# Patient Record
Sex: MALE | Race: WHITE | NOT HISPANIC OR LATINO | Employment: OTHER | URBAN - METROPOLITAN AREA
[De-identification: names, ages, dates, MRNs, and addresses within clinical notes are randomized per-mention and may not be internally consistent; named-entity substitution may affect disease eponyms.]

---

## 2019-06-07 ENCOUNTER — HOSPITAL ENCOUNTER (OUTPATIENT)
Facility: HOSPITAL | Age: 64
Setting detail: OBSERVATION
LOS: 1 days | Discharge: HOME | End: 2019-06-08
Attending: INTERNAL MEDICINE | Admitting: INTERNAL MEDICINE

## 2019-06-07 ENCOUNTER — APPOINTMENT (OUTPATIENT)
Dept: GENERAL RADIOLOGY | Facility: HOSPITAL | Age: 64
End: 2019-06-07

## 2019-06-07 ENCOUNTER — APPOINTMENT (OUTPATIENT)
Dept: MRI IMAGING | Facility: HOSPITAL | Age: 64
End: 2019-06-07

## 2019-06-07 DIAGNOSIS — Z74.09 IMPAIRED FUNCTIONAL MOBILITY, BALANCE, GAIT, AND ENDURANCE: ICD-10-CM

## 2019-06-07 DIAGNOSIS — Z78.9 IMPAIRED MOBILITY AND ADLS: Primary | ICD-10-CM

## 2019-06-07 DIAGNOSIS — Z74.09 IMPAIRED MOBILITY AND ADLS: Primary | ICD-10-CM

## 2019-06-07 PROBLEM — I63.9 CVA (CEREBRAL VASCULAR ACCIDENT) (HCC): Status: ACTIVE | Noted: 2019-06-07

## 2019-06-07 PROBLEM — G45.9 TRANSIENT ISCHEMIC ATTACK (TIA): Status: ACTIVE | Noted: 2019-06-07

## 2019-06-07 PROBLEM — G45.9 TIA (TRANSIENT ISCHEMIC ATTACK): Status: ACTIVE | Noted: 2019-06-07

## 2019-06-07 PROBLEM — I10 ESSENTIAL HYPERTENSION: Status: ACTIVE | Noted: 2019-06-07

## 2019-06-07 PROBLEM — N28.9 RENAL INSUFFICIENCY: Status: ACTIVE | Noted: 2019-06-07

## 2019-06-07 LAB — GLUCOSE BLDC GLUCOMTR-MCNC: 94 MG/DL (ref 70–130)

## 2019-06-07 PROCEDURE — G0378 HOSPITAL OBSERVATION PER HR: HCPCS

## 2019-06-07 PROCEDURE — 72141 MRI NECK SPINE W/O DYE: CPT

## 2019-06-07 PROCEDURE — 70200 X-RAY EXAM OF EYE SOCKETS: CPT

## 2019-06-07 PROCEDURE — 99220 PR INITIAL OBSERVATION CARE/DAY 70 MINUTES: CPT | Performed by: HOSPITALIST

## 2019-06-07 PROCEDURE — 70548 MR ANGIOGRAPHY NECK W/DYE: CPT

## 2019-06-07 PROCEDURE — A9577 INJ MULTIHANCE: HCPCS | Performed by: HOSPITALIST

## 2019-06-07 PROCEDURE — 70551 MRI BRAIN STEM W/O DYE: CPT

## 2019-06-07 PROCEDURE — 99205 OFFICE O/P NEW HI 60 MIN: CPT | Performed by: PSYCHIATRY & NEUROLOGY

## 2019-06-07 PROCEDURE — 82962 GLUCOSE BLOOD TEST: CPT

## 2019-06-07 PROCEDURE — 70544 MR ANGIOGRAPHY HEAD W/O DYE: CPT

## 2019-06-07 PROCEDURE — 0 GADOBENATE DIMEGLUMINE 529 MG/ML SOLUTION: Performed by: HOSPITALIST

## 2019-06-07 RX ORDER — SODIUM CHLORIDE 0.9 % (FLUSH) 0.9 %
3 SYRINGE (ML) INJECTION EVERY 12 HOURS SCHEDULED
Status: DISCONTINUED | OUTPATIENT
Start: 2019-06-07 | End: 2019-06-08 | Stop reason: HOSPADM

## 2019-06-07 RX ORDER — ASPIRIN 300 MG/1
300 SUPPOSITORY RECTAL DAILY
Status: DISCONTINUED | OUTPATIENT
Start: 2019-06-08 | End: 2019-06-08 | Stop reason: HOSPADM

## 2019-06-07 RX ORDER — SUCRALFATE 1 G/1
1 TABLET ORAL
Status: DISCONTINUED | OUTPATIENT
Start: 2019-06-08 | End: 2019-06-08 | Stop reason: HOSPADM

## 2019-06-07 RX ORDER — SODIUM CHLORIDE 0.9 % (FLUSH) 0.9 %
3-10 SYRINGE (ML) INJECTION AS NEEDED
Status: DISCONTINUED | OUTPATIENT
Start: 2019-06-07 | End: 2019-06-08 | Stop reason: HOSPADM

## 2019-06-07 RX ORDER — LANSOPRAZOLE 30 MG/1
30 CAPSULE, DELAYED RELEASE ORAL DAILY
COMMUNITY

## 2019-06-07 RX ORDER — ATORVASTATIN CALCIUM 40 MG/1
80 TABLET, FILM COATED ORAL NIGHTLY
Status: DISCONTINUED | OUTPATIENT
Start: 2019-06-08 | End: 2019-06-08 | Stop reason: HOSPADM

## 2019-06-07 RX ORDER — SODIUM CHLORIDE 9 MG/ML
50 INJECTION, SOLUTION INTRAVENOUS CONTINUOUS
Status: DISCONTINUED | OUTPATIENT
Start: 2019-06-08 | End: 2019-06-08

## 2019-06-07 RX ORDER — SUCRALFATE 1 G/1
1 TABLET ORAL 3 TIMES DAILY
Status: ON HOLD | COMMUNITY
End: 2020-02-19

## 2019-06-07 RX ORDER — SPIRONOLACTONE 50 MG/1
50 TABLET, FILM COATED ORAL 4 TIMES DAILY
COMMUNITY

## 2019-06-07 RX ORDER — AMILORIDE HYDROCHLORIDE 5 MG/1
5 TABLET ORAL NIGHTLY
COMMUNITY

## 2019-06-07 RX ORDER — LORAZEPAM 1 MG/1
1 TABLET ORAL 3 TIMES DAILY PRN
COMMUNITY

## 2019-06-07 RX ORDER — LORAZEPAM 1 MG/1
1 TABLET ORAL 3 TIMES DAILY PRN
Status: DISCONTINUED | OUTPATIENT
Start: 2019-06-07 | End: 2019-06-08 | Stop reason: HOSPADM

## 2019-06-07 RX ORDER — ASPIRIN 81 MG/1
81 TABLET, CHEWABLE ORAL DAILY
Status: DISCONTINUED | OUTPATIENT
Start: 2019-06-08 | End: 2019-06-08 | Stop reason: HOSPADM

## 2019-06-07 RX ORDER — POTASSIUM CHLORIDE 750 MG/1
50 TABLET, FILM COATED, EXTENDED RELEASE ORAL 4 TIMES DAILY
COMMUNITY

## 2019-06-07 RX ORDER — ATORVASTATIN CALCIUM 40 MG/1
80 TABLET, FILM COATED ORAL NIGHTLY
Status: DISCONTINUED | OUTPATIENT
Start: 2019-06-07 | End: 2019-06-07

## 2019-06-07 RX ORDER — PANTOPRAZOLE SODIUM 40 MG/1
40 TABLET, DELAYED RELEASE ORAL
Status: DISCONTINUED | OUTPATIENT
Start: 2019-06-08 | End: 2019-06-08 | Stop reason: HOSPADM

## 2019-06-07 RX ADMIN — SODIUM CHLORIDE, PRESERVATIVE FREE 3 ML: 5 INJECTION INTRAVENOUS at 23:21

## 2019-06-07 RX ADMIN — LORAZEPAM 1 MG: 1 TABLET ORAL at 23:21

## 2019-06-07 RX ADMIN — GADOBENATE DIMEGLUMINE 10 ML: 529 INJECTION, SOLUTION INTRAVENOUS at 22:45

## 2019-06-07 RX ADMIN — SODIUM CHLORIDE 50 ML/HR: 9 INJECTION, SOLUTION INTRAVENOUS at 23:21

## 2019-06-07 NOTE — NURSING NOTE
ACC REVIEW REPORT: [facility]          PATIENT NAME: Jaswant Gonzales    PATIENT ID: [id number]    BED: S333    BED TYPE: TELE    BED GIVEN TO: ADRI ZUÑIGA RN    TIME BED GIVEN: 1236    YOB: 1955    AGE: 64    GENDER: MALE    PREVIOUS ADMIT TO [facility]: NO    PREVIOUS ADMISSION DATE:     PATIENT CLASS: OUTPATIENT    TODAY'S DATE: 6/7/2019    TRANSFER DATE: 6/7/19    ETA: 1544    TRANSFERRING FACILITY: [facility]    TRANSFERRING FACILITY PHONE # : [phone]    TRANSFERRING MD: MELISSA    DATE/TIME REQUEST RECEIVED: 6-7-19 @ [address] [place] [address] RN: PAULO HOLLOWAY RN    REPORT FROM: ADRI ZUÑIGA RN    TIME REPORT TAKEN: 1236    DIAGNOSIS: CVA    REASON FOR TRANSFER TO [facility]: HIGHER LEVEL OF CARE    TRANSPORTATION: AMBULANCE    CLINICAL REASON FOR TRANSFER TO [facility]: HIGHER LEVEL OF CARE      CLINICAL INFORMATION    HEIGHT: 66 INCHES    WEIGHT: 145 LBS    ALLERGIES: KNDA    VEGA: NO    INFECTIOUS DISEASE: NONE    ISOLATION: NONE        LAST VITAL SIGNS:  TIME:   TEMP: 98.0  PULSE: 75  B/P: 167/76  RESP: 18    LAB INFORMATION:   CREATINE 1.80  SODIUM 133    CULTURE INFORMATION: NONE    MEDS/IV FLUIDS:   # 20 RIGHT AC  NS 1000 ML   MG  LIPITOR 80 MG        CARDIAC SYSTEM:    CHEST PAIN: NONE    RATE:     SCALE:     RHYTHM: NSR    Is patient taking or has patient been given any drugs that could increase bleeding? NONE  (Plavix, Brilinta, Effient, Eliquis, Xarelto, Warfarin, Integrilin, Angiomax)    DRUG:      DOSE/FREQUENCY:       RESPIRATORY SYSTEM:    LUNG SOUNDS:    CLEAR: YES  CRACKLES:   WHEEZES:   RHONCHI:   DIMINISHED:     OXYGEN: ROOM AIR    O2 SAT: 97 %    RESPIRATORY STATUS: STABLE      CNS/MUSCULOSKELETAL    ALERT AND ORIENTED:    PERSON: YES  PLACE: YES  TIME: YES    INJURY:  WHERE: NONE    SONIA COMA SCALE:    E: 4  M: 6  V: 5    STROKE SCALE: 0      CAT SCAN RESULTS:   NEGATIVE     MRI RESULTS:     CNS/MUSCULOSKELETAL NOTES: PATIENT HAS HAD 2 SPELLS CAUSE SOME SORT OF  PARESIS OR NUMBNESS BOTH LASTING LESS THAN 10 MINUTES      GI//GY      ABDOMINAL PAIN: NO    VOMITING: NO    DIARRHEA: NO    NAUSEA: NO    BOWEL SOUNDS: ACTIVE    OCCULT STOOL: NONE    VAGINAL BLEEDING: N/A    TESTICULAR PAIN: NO    HEMATURIA: NO    PAST MEDICAL HISTORY:   BARTERS SYNDROME    OTHER SYMPTOM NOTES:   RIGHT SIDED NUMBNESS  UNABLE TO WALK  LASTED ABOUT 10 TO  15 MINUTES     SECOND EPISODE   RIGHT FACIAL DROOP  UNABLE TO TALK  LASTED ABOUT 10 MINUTES    ADDITIONAL NOTES:           Gill Maldonado RN  6/7/2019  12:42 PM

## 2019-06-07 NOTE — PLAN OF CARE
Problem: Patient Care Overview  Goal: Plan of Care Review  Outcome: Ongoing (interventions implemented as appropriate)   06/07/19 1847   Coping/Psychosocial   Plan of Care Reviewed With patient;spouse   Plan of Care Review   Progress no change   OTHER   Outcome Summary Pt is a direct admission from [place] with two episodes of stroke-like numbness of right side and face; symptoms caused pt to fall at home. Currently no such symptoms, NIH 0 on admission. Pt up with one assist for fall prevention. MRI still to be completed. VSS.        Problem: Fall Risk (Adult)  Goal: Identify Related Risk Factors and Signs and Symptoms  Outcome: Outcome(s) achieved Date Met: 06/07/19 06/07/19 1847   Fall Risk (Adult)   Related Risk Factors (Fall Risk) age-related changes;history of falls;impaired vision;sleep pattern alteration;slippery/uneven surfaces;environment unfamiliar   Signs and Symptoms (Fall Risk) presence of risk factors     Goal: Absence of Fall  Outcome: Outcome(s) achieved Date Met: 06/07/19 06/07/19 1847   Fall Risk (Adult)   Absence of Fall achieves outcome  (on this shift)       Problem: Stroke (Ischemic) (Adult)  Goal: Signs and Symptoms of Listed Potential Problems Will be Absent, Minimized or Managed (Stroke)  Outcome: Ongoing (interventions implemented as appropriate)   06/07/19 1847   Goal/Outcome Evaluation   Problems Assessed (Stroke (Ischemic)) all   Problems Assessed (Stroke (Ischemic)) situational response  (NIH 0; no deficits noted at this time)

## 2019-06-07 NOTE — CONSULTS
"Subjective     CC: TIA    History of Present Illness   Jaswant Gonzales is a 64 y.o. male is seen today in consultation for suspected TIA. He has had 3 episodes today lasting about 3-5 minutes each of right hemiparesis/hemianesthesia, associated on at least one occasion with right facial paresis and aphasia. There are no triggering or modifying factors.    A head CT was normal in [place]. He was given  mg and Lipitor 80 mg in [place] prior to transfer to [facility].    I have reviewed and confirmed the past family, social and medical history as accurate on 6/7/19.    FH: reviewed and non-contributory  PMH: HTN, kidney disease     Review of Systems   Constitutional: Negative.    Respiratory: Negative.    Cardiovascular: Negative.    Gastrointestinal: Negative.    Genitourinary: Negative.    Musculoskeletal: Negative.    All other systems reviewed and are negative.      Objective   General appearance today is normal.   Peripheral pulses were present and symmetric.   The ophthalmoscopic exam today is unremarkable. This discs and posterior elements are unremarkable.    /95 (BP Location: Right arm, Patient Position: Sitting)   Pulse 64   Temp 98 °F (36.7 °C) (Oral)   Resp 14   Ht 167.6 cm (66\")   Wt 65 kg (143 lb 6.4 oz)   SpO2 97%   BMI 23.15 kg/m²     Physical Exam   Constitutional: He is oriented to person, place, and time.   Neurological: He is oriented to person, place, and time. He has normal strength. He has a normal Finger-Nose-Finger Test. Gait normal.   Reflex Scores:       Tricep reflexes are 2+ on the right side and 2+ on the left side.       Bicep reflexes are 2+ on the right side and 2+ on the left side.       Brachioradialis reflexes are 2+ on the right side and 2+ on the left side.       Patellar reflexes are 2+ on the right side and 2+ on the left side.       Achilles reflexes are 2+ on the right side and 2+ on the left side.  Psychiatric: His speech is normal.        Neurologic Exam "     Mental Status   Oriented to person, place, and time.   Registration: recalls 3 of 3 objects. Recall at 5 minutes: recalls 2 of 3 objects. Follows 3 step commands.   Attention: normal. Concentration: normal.   Speech: speech is normal   Level of consciousness: alert  Knowledge: good.   Able to name object. Able to repeat. Normal comprehension.     Cranial Nerves   Cranial nerves II through XII intact.     Motor Exam   Muscle bulk: normal  Overall muscle tone: normal    Strength   Strength 5/5 throughout.     Sensory Exam   Light touch normal.     Gait, Coordination, and Reflexes     Gait  Gait: normal    Coordination   Finger to nose coordination: normal    Reflexes   Right brachioradialis: 2+  Left brachioradialis: 2+  Right biceps: 2+  Left biceps: 2+  Right triceps: 2+  Left triceps: 2+  Right patellar: 2+  Left patellar: 2+  Right achilles: 2+  Left achilles: 2+      Laboratory and radiological testing: glucose=94; orbit x-ray-no metal      Assessment/Plan     Jaswant Gonzales is admitted with suspected recurrent TIA's. His CTP/CTA were cancelled initially due to concern about renal insufficiency. I feel that imaging of his arteries is most critical, and MRI/MRA's have been ordered stat. If these fail to show evidence of critical stenosis, then I will favor continued ASA/statin therapy, and will await his ECHO. This was discussed with Mr. Gonzales and his wife.    As part of this visit I reviewed prior lab results, reviewed radiology results and obtained additional history from the family which is incorporated in the HPI. Please see above for details.

## 2019-06-08 ENCOUNTER — APPOINTMENT (OUTPATIENT)
Dept: CARDIOLOGY | Facility: HOSPITAL | Age: 64
End: 2019-06-08

## 2019-06-08 VITALS
BODY MASS INDEX: 22.98 KG/M2 | HEIGHT: 66 IN | TEMPERATURE: 97.8 F | OXYGEN SATURATION: 97 % | WEIGHT: 143 LBS | RESPIRATION RATE: 16 BRPM | SYSTOLIC BLOOD PRESSURE: 131 MMHG | DIASTOLIC BLOOD PRESSURE: 76 MMHG | HEART RATE: 61 BPM

## 2019-06-08 PROBLEM — M48.02 STENOSIS OF CERVICAL SPINE: Status: ACTIVE | Noted: 2019-06-08

## 2019-06-08 PROBLEM — N18.30 CKD (CHRONIC KIDNEY DISEASE) STAGE 3, GFR 30-59 ML/MIN (HCC): Status: ACTIVE | Noted: 2019-06-08

## 2019-06-08 LAB
ANION GAP SERPL CALCULATED.3IONS-SCNC: 16 MMOL/L
BH CV ECHO MEAS - AO MAX PG (FULL): 1.3 MMHG
BH CV ECHO MEAS - AO MAX PG: 3 MMHG
BH CV ECHO MEAS - AO MEAN PG (FULL): 0.77 MMHG
BH CV ECHO MEAS - AO MEAN PG: 1.6 MMHG
BH CV ECHO MEAS - AO V2 MAX: 86.3 CM/SEC
BH CV ECHO MEAS - AO V2 MEAN: 58.1 CM/SEC
BH CV ECHO MEAS - AO V2 VTI: 18 CM
BH CV ECHO MEAS - AVA(I,A): 2.4 CM^2
BH CV ECHO MEAS - AVA(I,D): 2.4 CM^2
BH CV ECHO MEAS - AVA(V,A): 2.7 CM^2
BH CV ECHO MEAS - AVA(V,D): 2.7 CM^2
BH CV ECHO MEAS - BSA(HAYCOCK): 1.7 M^2
BH CV ECHO MEAS - BSA: 1.7 M^2
BH CV ECHO MEAS - BZI_BMI: 23.1 KILOGRAMS/M^2
BH CV ECHO MEAS - BZI_METRIC_HEIGHT: 167.6 CM
BH CV ECHO MEAS - BZI_METRIC_WEIGHT: 64.9 KG
BH CV ECHO MEAS - EDV(CUBED): 119 ML
BH CV ECHO MEAS - EDV(MOD-SP2): 78 ML
BH CV ECHO MEAS - EDV(MOD-SP4): 81 ML
BH CV ECHO MEAS - EDV(TEICH): 113.8 ML
BH CV ECHO MEAS - EF(CUBED): 33.6 %
BH CV ECHO MEAS - EF(MOD-BP): 47 %
BH CV ECHO MEAS - EF(MOD-SP2): 51.3 %
BH CV ECHO MEAS - EF(MOD-SP4): 46.9 %
BH CV ECHO MEAS - EF(TEICH): 27.4 %
BH CV ECHO MEAS - ESV(CUBED): 79 ML
BH CV ECHO MEAS - ESV(MOD-SP2): 38 ML
BH CV ECHO MEAS - ESV(MOD-SP4): 43 ML
BH CV ECHO MEAS - ESV(TEICH): 82.6 ML
BH CV ECHO MEAS - FS: 12.8 %
BH CV ECHO MEAS - IVS/LVPW: 1
BH CV ECHO MEAS - IVSD: 1.1 CM
BH CV ECHO MEAS - LA DIMENSION: 2.8 CM
BH CV ECHO MEAS - LAD MAJOR: 3.4 CM
BH CV ECHO MEAS - LAT PEAK E' VEL: 4.6 CM/SEC
BH CV ECHO MEAS - LATERAL E/E' RATIO: 9.1
BH CV ECHO MEAS - LV DIASTOLIC VOL/BSA (35-75): 46.7 ML/M^2
BH CV ECHO MEAS - LV MASS(C)D: 185.4 GRAMS
BH CV ECHO MEAS - LV MASS(C)DI: 106.9 GRAMS/M^2
BH CV ECHO MEAS - LV MAX PG: 1.7 MMHG
BH CV ECHO MEAS - LV MEAN PG: 0.81 MMHG
BH CV ECHO MEAS - LV SYSTOLIC VOL/BSA (12-30): 24.8 ML/M^2
BH CV ECHO MEAS - LV V1 MAX: 65.2 CM/SEC
BH CV ECHO MEAS - LV V1 MEAN: 42.2 CM/SEC
BH CV ECHO MEAS - LV V1 VTI: 12.3 CM
BH CV ECHO MEAS - LVIDD: 4.9 CM
BH CV ECHO MEAS - LVIDS: 4.3 CM
BH CV ECHO MEAS - LVLD AP2: 7.1 CM
BH CV ECHO MEAS - LVLD AP4: 7 CM
BH CV ECHO MEAS - LVLS AP2: 6.2 CM
BH CV ECHO MEAS - LVLS AP4: 6.2 CM
BH CV ECHO MEAS - LVOT AREA (M): 3.5 CM^2
BH CV ECHO MEAS - LVOT AREA: 3.6 CM^2
BH CV ECHO MEAS - LVOT DIAM: 2.1 CM
BH CV ECHO MEAS - LVPWD: 1.1 CM
BH CV ECHO MEAS - MED PEAK E' VEL: 4.7 CM/SEC
BH CV ECHO MEAS - MEDIAL E/E' RATIO: 8.8
BH CV ECHO MEAS - MV A MAX VEL: 63.7 CM/SEC
BH CV ECHO MEAS - MV DEC TIME: 0.38 SEC
BH CV ECHO MEAS - MV E MAX VEL: 42.9 CM/SEC
BH CV ECHO MEAS - MV E/A: 0.67
BH CV ECHO MEAS - PA ACC SLOPE: 730.9 CM/SEC^2
BH CV ECHO MEAS - PA ACC TIME: 0.1 SEC
BH CV ECHO MEAS - PA MAX PG: 2.3 MMHG
BH CV ECHO MEAS - PA PR(ACCEL): 34.6 MMHG
BH CV ECHO MEAS - PA V2 MAX: 76 CM/SEC
BH CV ECHO MEAS - PI END-D VEL: 112.7 CM/SEC
BH CV ECHO MEAS - SI(CUBED): 23.1 ML/M^2
BH CV ECHO MEAS - SI(LVOT): 25.3 ML/M^2
BH CV ECHO MEAS - SI(MOD-SP2): 23.1 ML/M^2
BH CV ECHO MEAS - SI(MOD-SP4): 21.9 ML/M^2
BH CV ECHO MEAS - SI(TEICH): 18 ML/M^2
BH CV ECHO MEAS - SV(CUBED): 40 ML
BH CV ECHO MEAS - SV(LVOT): 44 ML
BH CV ECHO MEAS - SV(MOD-SP2): 40 ML
BH CV ECHO MEAS - SV(MOD-SP4): 38 ML
BH CV ECHO MEAS - SV(TEICH): 31.2 ML
BH CV ECHO MEAS - TAPSE (>1.6): 1.8 CM2
BH CV ECHO MEASUREMENTS AVERAGE E/E' RATIO: 9.23
BH CV VAS BP RIGHT ARM: NORMAL MMHG
BH CV XLRA - RV BASE: 2.8 CM
BH CV XLRA - RV LENGTH: 5.8 CM
BH CV XLRA - RV MID: 2.3 CM
BH CV XLRA - TDI S': 9.91 CM/SEC
BUN BLD-MCNC: 19 MG/DL (ref 8–23)
BUN/CREAT SERPL: 14.1 (ref 7–25)
CALCIUM SPEC-SCNC: 9.2 MG/DL (ref 8.6–10.5)
CHLORIDE SERPL-SCNC: 95 MMOL/L (ref 98–107)
CHOLEST SERPL-MCNC: 203 MG/DL (ref 0–200)
CO2 SERPL-SCNC: 25 MMOL/L (ref 22–29)
CREAT BLD-MCNC: 1.35 MG/DL (ref 0.76–1.27)
DEPRECATED RDW RBC AUTO: 39.4 FL (ref 37–54)
ERYTHROCYTE [DISTWIDTH] IN BLOOD BY AUTOMATED COUNT: 11.9 % (ref 12.3–15.4)
GFR SERPL CREATININE-BSD FRML MDRD: 53 ML/MIN/1.73
GLUCOSE BLD-MCNC: 92 MG/DL (ref 65–99)
GLUCOSE BLDC GLUCOMTR-MCNC: 91 MG/DL (ref 70–130)
HBA1C MFR BLD: 5.3 % (ref 4.8–5.6)
HCT VFR BLD AUTO: 47.3 % (ref 37.5–51)
HDLC SERPL-MCNC: 37 MG/DL (ref 40–60)
HGB BLD-MCNC: 15.6 G/DL (ref 13–17.7)
LDLC SERPL CALC-MCNC: 134 MG/DL (ref 0–100)
LDLC/HDLC SERPL: 3.63 {RATIO}
LEFT ATRIUM VOLUME INDEX: 13.8 ML/M^2
LEFT ATRIUM VOLUME: 24 ML
LV EF 2D ECHO EST: 50 %
MCH RBC QN AUTO: 29.9 PG (ref 26.6–33)
MCHC RBC AUTO-ENTMCNC: 33 G/DL (ref 31.5–35.7)
MCV RBC AUTO: 90.8 FL (ref 79–97)
PLATELET # BLD AUTO: 234 10*3/MM3 (ref 140–450)
PMV BLD AUTO: 9.4 FL (ref 6–12)
POTASSIUM BLD-SCNC: 4 MMOL/L (ref 3.5–5.2)
RBC # BLD AUTO: 5.21 10*6/MM3 (ref 4.14–5.8)
SODIUM BLD-SCNC: 136 MMOL/L (ref 136–145)
TRIGL SERPL-MCNC: 159 MG/DL (ref 0–150)
VLDLC SERPL-MCNC: 31.8 MG/DL
WBC NRBC COR # BLD: 7.32 10*3/MM3 (ref 3.4–10.8)

## 2019-06-08 PROCEDURE — G0378 HOSPITAL OBSERVATION PER HR: HCPCS

## 2019-06-08 PROCEDURE — 99213 OFFICE O/P EST LOW 20 MIN: CPT | Performed by: PSYCHIATRY & NEUROLOGY

## 2019-06-08 PROCEDURE — 93306 TTE W/DOPPLER COMPLETE: CPT

## 2019-06-08 PROCEDURE — 97161 PT EVAL LOW COMPLEX 20 MIN: CPT

## 2019-06-08 PROCEDURE — 80048 BASIC METABOLIC PNL TOTAL CA: CPT | Performed by: HOSPITALIST

## 2019-06-08 PROCEDURE — 92523 SPEECH SOUND LANG COMPREHEN: CPT

## 2019-06-08 PROCEDURE — 99217 PR OBSERVATION CARE DISCHARGE MANAGEMENT: CPT | Performed by: INTERNAL MEDICINE

## 2019-06-08 PROCEDURE — 83036 HEMOGLOBIN GLYCOSYLATED A1C: CPT | Performed by: HOSPITALIST

## 2019-06-08 PROCEDURE — 82962 GLUCOSE BLOOD TEST: CPT

## 2019-06-08 PROCEDURE — 93306 TTE W/DOPPLER COMPLETE: CPT | Performed by: INTERNAL MEDICINE

## 2019-06-08 PROCEDURE — 97165 OT EVAL LOW COMPLEX 30 MIN: CPT

## 2019-06-08 PROCEDURE — 80061 LIPID PANEL: CPT | Performed by: HOSPITALIST

## 2019-06-08 PROCEDURE — 85027 COMPLETE CBC AUTOMATED: CPT | Performed by: HOSPITALIST

## 2019-06-08 RX ORDER — ACETAMINOPHEN 325 MG/1
650 TABLET ORAL EVERY 6 HOURS PRN
Status: DISCONTINUED | OUTPATIENT
Start: 2019-06-08 | End: 2019-06-08 | Stop reason: HOSPADM

## 2019-06-08 RX ORDER — AMLODIPINE BESYLATE 2.5 MG/1
2.5 TABLET ORAL
Qty: 30 TABLET | Refills: 0 | Status: ON HOLD | OUTPATIENT
Start: 2019-06-08 | End: 2020-02-19

## 2019-06-08 RX ORDER — ASPIRIN 81 MG/1
81 TABLET, CHEWABLE ORAL DAILY
Qty: 90 TABLET | Refills: 0 | Status: ON HOLD | OUTPATIENT
Start: 2019-06-08 | End: 2020-02-19

## 2019-06-08 RX ORDER — AMLODIPINE BESYLATE 2.5 MG/1
2.5 TABLET ORAL
Status: DISCONTINUED | OUTPATIENT
Start: 2019-06-08 | End: 2019-06-08 | Stop reason: HOSPADM

## 2019-06-08 RX ORDER — ATORVASTATIN CALCIUM 80 MG/1
40 TABLET, FILM COATED ORAL NIGHTLY
Qty: 30 TABLET | Refills: 0 | Status: ON HOLD | OUTPATIENT
Start: 2019-06-08 | End: 2020-02-19

## 2019-06-08 RX ADMIN — AMLODIPINE BESYLATE 2.5 MG: 2.5 TABLET ORAL at 09:56

## 2019-06-08 RX ADMIN — SUCRALFATE 1 G: 1 TABLET ORAL at 06:53

## 2019-06-08 RX ADMIN — ASPIRIN 81 MG 81 MG: 81 TABLET ORAL at 09:56

## 2019-06-08 RX ADMIN — MAGNESIUM OXIDE TAB 400 MG (241.3 MG ELEMENTAL MG) 400 MG: 400 (241.3 MG) TAB at 09:56

## 2019-06-08 RX ADMIN — PANTOPRAZOLE SODIUM 40 MG: 40 TABLET, DELAYED RELEASE ORAL at 06:53

## 2019-06-08 NOTE — PLAN OF CARE
Problem: Patient Care Overview  Goal: Plan of Care Review  Outcome: Ongoing (interventions implemented as appropriate)   06/08/19 3367   Coping/Psychosocial   Plan of Care Reviewed With patient   Plan of Care Review   Progress no change   OTHER   Outcome Summary Pt is a/o x 4. VSS. Pt ambulates well on his own. Due to recent fall he is a falls risk, but refuses the bed alarm. Wife has agreed to standby while the pt ambulates. Pt currently has NIH of 0 and all symptoms he was previously having are resolved. No additional needs or concerns at this time. Will continue to monitor.

## 2019-06-08 NOTE — DISCHARGE INSTR - ACTIVITY
Do not Drive and Do Not Return to Work until you have seen your Primary Care Provider and he/she approves driving and returning to work.

## 2019-06-08 NOTE — PLAN OF CARE
Problem: Patient Care Overview  Goal: Plan of Care Review  Outcome: Ongoing (interventions implemented as appropriate)   06/08/19 7885   Coping/Psychosocial   Plan of Care Reviewed With patient;spouse   SLP evaluation completed. Will sign-off for communication/cognition at this time. Please see note for further details and recommendations.

## 2019-06-08 NOTE — PROGRESS NOTES
"Jaswant Gonzales    Subjective     CC: TIA    History of Present Illness     Jaswant Gonzales is a 64 y.o. male who presented following three episodes yesterday with right sided weakness with aphasia and some facial paresis. Patient reports episodes lasted about 3-5 minutes before resolving completely. Patient reports that he did fall with one episode and called family at that time. He denies any repeat of his symptoms since yesterday and that he is feeling back to his baseline.       There have been no other changes in the patient's interval history since my consult note of 6/7/2019    I have reviewed and confirmed the past family, social and medical history as accurate on 6/8/2019.     Review of Systems   Constitutional: Negative for activity change, appetite change, fatigue and fever.   HENT: Negative for ear pain and sore throat.    Eyes: Negative for pain and visual disturbance.   Respiratory: Negative for cough and shortness of breath.    Cardiovascular: Negative for chest pain and palpitations.   Gastrointestinal: Negative for abdominal pain and nausea.   Endocrine: Negative for cold intolerance and heat intolerance.   Genitourinary: Negative for difficulty urinating and dysuria.   Musculoskeletal: Negative for arthralgias and gait problem.   Skin: Negative for color change and rash.   Neurological: Negative for dizziness, weakness and headaches.   Hematological: Negative for adenopathy. Does not bruise/bleed easily.   Psychiatric/Behavioral: Negative for agitation, confusion and sleep disturbance.       Objective     /81 (BP Location: Right arm, Patient Position: Lying)   Pulse 58   Temp 97.6 °F (36.4 °C) (Oral)   Resp 14   Ht 167.6 cm (66\")   Wt 64.9 kg (143 lb)   SpO2 97%   BMI 23.08 kg/m²     Physical Exam   Constitutional: He is oriented to person, place, and time. He appears well-developed and well-nourished. No distress.   HENT:   Head: Normocephalic.   Right Ear: External ear normal.   Left Ear: " External ear normal.   Eyes: EOM are normal. Pupils are equal, round, and reactive to light.   Neck: Normal range of motion. Neck supple.   Pulmonary/Chest: Effort normal.   Musculoskeletal: Normal range of motion.   Neurological: He is alert and oriented to person, place, and time. No cranial nerve deficit.   Skin: Skin is warm and dry. Capillary refill takes less than 2 seconds. He is not diaphoretic.   Psychiatric: He has a normal mood and affect. His speech is normal and behavior is normal. Judgment and thought content normal.        Neurologic Exam     Mental Status   Oriented to person, place, and time.   Oriented to person.   Oriented to place. Oriented to country, city and area.   Oriented to time. Oriented to year, month, date, day and season.   Attention: normal. Concentration: normal.   Speech: speech is normal   Normal comprehension.     Cranial Nerves     CN III, IV, VI   Pupils are equal, round, and reactive to light.  Extraocular motions are normal.     CN V   Facial sensation intact.     CN VII   Facial expression full, symmetric.     CN XI   Right sternocleidomastoid strength: normal  Left sternocleidomastoid strength: normal  Right trapezius strength: normal  Left trapezius strength: normal    CN XII   Tongue: not atrophic  Tongue deviation: none    Motor Exam   Muscle bulk: normal  Overall muscle tone: normal  Right arm tone: normal  Left arm tone: normal  Right leg tone: normal  Left leg tone: normal      Laboratory and radiological testing is notable for hyperlipidemia, decreased renal function. Preliminary read of ECHO, awaiting final read, demonstrates EF 51-55% with no abnormal valvular structure. MRI did not demonstrate any evidence of a stroke or mass lesion, awaiting final read by neuroradiology. MRI of the cervical spine did demonstrate spinal stenosis but without myelopathy. Angiogram of brain and neck did not demonstrate any significant stenosis nor aneurysm.     Assessment/Plan        Jaswant Gonzales is currently at baseline, ambulating in his room, with no return of his symptoms. He has been started on ASA and Lipitor, recommend continuation at discharge. Continue blood pressure control. Awaiting final read of ECHO. I would recommend follow-up with neurosurgery as an outpatient to address his spinal stenosis, but I don't believe this is related to his current symptoms.      Odette Yuan MD PGY3  [place] [address]  Office: [phone]          This document has been electronically signed by Odette Yuan MD on June 8, 2019 9:48 AM      The patient was seen and examined with Dr. Yuan, [facility] Resident, who performed portions of the examination and documentation for this patient's care under my direct supervision

## 2019-06-08 NOTE — THERAPY EVALUATION
Acute Care - Occupational Therapy Initial Evaluation  [facility]     Patient Name: Jaswant Gonzales  : 1955  MRN: [id number]  Today's Date: 2019  Onset of Illness/Injury or Date of Surgery: 19  Date of Referral to OT: 19  Referring Physician: MD Jose Alberto    Admit Date: 2019       ICD-10-CM ICD-9-CM   1. Impaired mobility and ADLs Z74.09 799.89     Patient Active Problem List   Diagnosis   • Transient ischemic attack (TIA)   • Essential hypertension   • Renal insufficiency   • TIA (transient ischemic attack)   • Severe stenosis of cervical spine on MRI     Past Medical History:   Diagnosis Date   • Hypertension      No past surgical history on file.       OT ASSESSMENT FLOWSHEET (last 12 hours)      Occupational Therapy Evaluation     Row Name 19 0811                   OT Evaluation Time/Intention    Subjective Information  no complaints  -AN        Document Type  evaluation  -AN        Mode of Treatment  occupational therapy  -AN        Patient Effort  good  -AN        Symptoms Noted During/After Treatment  none  -AN           General Information    Patient Profile Reviewed?  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  MRi shows no acute injury.  -AN        Existing Precautions/Restrictions  fall  -AN        Risks Reviewed  patient:;LOB;dizziness;increased  discomfort;change in vital signs  -AN        Benefits Reviewed  patient:;improve function;increase independence;increase strength;increase balance  -AN        Barriers to Rehab  none identified  -AN           Relationship/Environment    Primary Source of Support/Comfort  spouse  -AN        Lives With  spouse  -AN        Primary Roles/Responsibilities  wage earner, full time  -AN           Resource/Environmental Concerns    Current Living Arrangements  home/apartment/condo  -AN           Cognitive Assessment/Interventions    Additional Documentation  Cognitive Assessment/Intervention (Group)  -AN           Cognitive Assessment/Intervention- PT/OT    Affect/Mental Status (Cognitive)  WFL  -AN        Orientation Status (Cognition)  oriented x 4  -AN        Follows Commands (Cognition)  WNL  -AN        Personal Safety Interventions  fall prevention program maintained  -AN           Bed Mobility Assessment/Treatment    Bed Mobility Assessment/Treatment  sit-supine;supine-sit  -AN        Supine-Sit [place] (Bed Mobility)  independent  -AN        Sit-Supine [place] (Bed Mobility)  independent  -AN           Functional Mobility    Functional Mobility- Ind. Level  conditional independence  -AN        Functional Mobility-Distance (Feet)  20  -AN        Functional Mobility- Comment  IV pole  -AN           Transfer Assessment/Treatment    Transfer Assessment/Treatment  sit-stand transfer;stand-sit transfer;shower transfer  -AN           Sit-Stand Transfer    Sit-Stand [place] (Transfers)  independent  -AN           Stand-Sit Transfer    Stand-Sit [place] (Transfers)  independent  -AN           Shower Transfer    Type (Shower Transfer)  lateral  -AN        [place] Level (Shower Transfer)  conditional independence simulated increased elevation for tub shower  -AN           ADL Assessment/Intervention    BADL Assessment/Intervention  lower body dressing;bathing  -AN           Bathing  Assessment/Intervention    Bathing [place] Level  bathing skills;conditional independence  -AN        Comment (Bathing)  simulated standing  -AN           Lower Body Dressing Assessment/Training    Lower Body Dressing [place] Level  lower body dressing skills;conditional independence  -AN        Comment (Lower Body Dressing)  simulated  -AN           General ROM    GENERAL ROM COMMENTS  [place] UE WFL  -AN           MMT (Manual Muscle Testing)    Rt Upper Ext  Rt Elbow Flexion;Rt Elbow Extension  -AN        General MMT Comments  R distally decreased strength; R shoulder, L UE 5/5  -AN           MMT Right Upper Ext    Rt Elbow Flexion MMT, Gross Movement:  (4/5) good  -AN        Rt Elbow Extension MMT, Gross Movement:  (4/5) good  -AN           Motor Assessment/Interventions    Additional Documentation  Balance (Group);Gross Motor Coordination (Group);Therapeutic Exercise (Group)  -AN           Gross Motor Coordination    Gross Motor Skill, Impairments Detail  mild to moderate deficit LEFT finger to nose, hit above bridge of nose consistently; R mild deficit  -AN        Gross Motor Coordination Interventions  -- GM UE reaching and touching object, 90% accuracy  -AN           Balance    Balance  dynamic sitting balance;dynamic standing balance  -AN           Dynamic Sitting Balance    Level of [place], Reaches Outside Midline (Sitting, Dynamic Balance)  independent  -AN        Sitting Position, Reaches Outside Midline (Sitting, Dynamic Balance)  sitting on edge of bed  -AN        Comment, Reaches Outside Midline (Sitting, Dynamic Balance)  L/R, A/P reach  -AN           Dynamic Standing Balance    Level of [place], Reaches Outside Midline (Standing, Dynamic Balance)  independent  -AN        Comment, Reaches Outside Midline (Standing, Dynamic Balance)  L/R weight shift, txfrs  -AN           Sensory Assessment/Intervention    Sensory General Assessment  no sensation deficits identified  -AN         Additional Documentation  Vision Assessment/Intervention (Group)  -AN           Vision Assessment/Intervention    Visual Impairment/Limitations  peripheral vision impaired left mild, may inpact driving  -AN           Positioning and Restraints    Pre-Treatment Position  in bed  -AN        Post Treatment Position  bed  -AN        In Bed  fowlers;call light within reach;encouraged to call for assist;with family/caregiver;with PT  -AN           Pain Assessment    Additional Documentation  Pain Scale: Numbers Pre/Post-Treatment (Group)  -AN           Pain Scale: Numbers Pre/Post-Treatment    Pain Scale: Numbers, Pretreatment  0/10 - no pain  -AN        Pain Scale: Numbers, Post-Treatment  0/10 - no pain  -AN           Plan of Care Review    Plan of Care Reviewed With  patient  -AN           Clinical Impression (OT)    Date of Referral to OT  06/07/19  -AN        OT Diagnosis  Impaired ADL  -AN        Patient/Family Goals Statement (OT Eval)  agreeable to Eval; pt reports he is ready to go home  -AN        Criteria for Skilled Therapeutic Interventions Met (OT Eval)  yes;treatment indicated  -AN        Rehab Potential (OT Eval)  good, to achieve stated therapy goals  -AN        Therapy Frequency (OT Eval)  daily  -AN        Care Plan Review (OT)  evaluation/treatment results reviewed;risks/benefits reviewed  -AN        Anticipated Discharge Disposition (OT)  home  -AN           Vital Signs    Pre Systolic BP Rehab  131  -AN        Pre Treatment Diastolic BP  81  -AN        Post Systolic BP Rehab  140  -AN        Post Treatment Diastolic BP  84  -AN        Pretreatment Heart Rate (beats/min)  68  -AN        Posttreatment Heart Rate (beats/min)  59  -AN           OT Goals    Strength Goal Selection (OT)  strength, OT goal 1  -AN        Coordination Goal Selection (OT)  coordination, OT goal 1  -AN        Problem Specific Goal Selection (OT)  problem specific goal 1, OT  -AN        Additional Documentation  Coordination  Goal Selection (OT) (Row);Strength Goal Selection (OT) (Row);Problem Specific Goal Selection (OT) (Row)  -AN           Strength Goal 1 (OT)    Strength Goal 1 (OT)  Pt will be I with UE HEP for strengthening to increase UE support for ADLs.  -AN        Time Frame (Strength Goal 1, OT)  10 days  -AN        Progress/Outcome (Strength Goal 1, OT)  goal ongoing  -AN           Coordination Goal 1 (OT)    Activity/Assistive Device (Coordination Goal 1, OT)  FM task;GM task  -AN        [place] Level/Cues Needed (Coordination Goal 1, OT)  independent  -AN        Time Frame (Coordination Goal 1, OT)  10 days  -AN        Progress/Outcomes (Coordination Goal 1, OT)  goal ongoing  -AN           Problem Specific Goal 1 (OT)    Problem Specific Goal 1 (OT)  Pt will be I with  and carry up to 10 lbs .  -AN        Time Frame (Problem Specific Goal 1, OT)  10 days  -AN        Progress/Outcome (Problem Specific Goal 1, OT)  goal ongoing  -AN           Living Environment    Home Accessibility  tub/shower is not walk in  -AN          User Key  (r) = Recorded By, (t) = Taken By, (c) = Cosigned By    Initials Name Effective Dates    AN Tiffany Murray OT 06/22/15 -          Occupational Therapy Education     Title: PT OT SLP Therapies (In Progress)     Topic: Occupational Therapy (In Progress)     Point: ADL training (Done)     Description: Instruct learner(s) on proper safety adaptation and remediation techniques during self care or transfers.   Instruct in proper use of assistive devices.    Learning Progress Summary           Patient Acceptance, E, VU,NR by AN at 6/8/2019  8:11 AM    Comment:  Educated on current deficits, change in ADLs, home safety.   Family Acceptance, E, VU,NR by AN at 6/8/2019  8:11 AM    Comment:  Educated on current deficits, change in ADLs, home safety.                               User Key     Initials Effective Dates Name Provider Type Discipline    AN 06/22/15 -  Tiffany Murray OT Occupational  Therapist OT                  OT Recommendation and Plan  Outcome Summary/Treatment Plan (OT)  Anticipated Discharge Disposition (OT): home  Therapy Frequency (OT Eval): daily  Plan of Care Review  Plan of Care Reviewed With: patient  Plan of Care Reviewed With: patient  Outcome Summary: Pt with current evolving symptoms that have decreased IADL I. Pt with distall weakness in R UE and decreased L periphreal and motor coordination skills. Pt should be cleared by MD to drive and return to work.     Outcome Measures     Row Name 06/08/19 0811             How much help from another is currently needed...    Putting on and taking off regular lower body clothing?  4  -AN      Bathing (including washing, rinsing, and drying)  4  -AN      Toileting (which includes using toilet bed pan or urinal)  4  -AN      Putting on and taking off regular upper body clothing  4  -AN      Taking care of personal grooming (such as brushing teeth)  4  -AN      Eating meals  4  -AN      Score  24  -AN         Modified Emerado Scale    Modified Emerado Scale  2 - Slight disability.  Unable to carry out all previous activities but able to look after own affairs without assistance.  -AN         Functional Assessment    Outcome Measure Options  AM-PAC 6 Clicks Daily Activity (OT);Modified [place]ado  -AN        User Key  (r) = Recorded By, (t) = Taken By, (c) = Cosigned By    Initials Name Provider Type    Tiffany Kuhn OT Occupational Therapist          Time Calculation:   Time Calculation- OT     Row Name 06/08/19 0854             Time Calculation- OT    OT Start Time  0811  -AN      Total Timed Code Minutes- OT  0 minute(s)  -AN      OT Received On  06/08/19  -AN      OT Goal Re-Cert Due Date  06/18/19  -AN        User Key  (r) = Recorded By, (t) = Taken By, (c) = Cosigned By    Initials Name Provider Type    Tiffany Kuhn OT Occupational Therapist        Therapy Charges for Today     Code Description Service Date Service Provider  Modifiers Qty    [id number]  OT EVAL LOW COMPLEXITY 4 6/8/2019 Tiffany Murray, OT GO 1               Tiffany Murray OT  6/8/2019

## 2019-06-08 NOTE — PLAN OF CARE
Problem: Patient Care Overview  Goal: Plan of Care Review  Outcome: Ongoing (interventions implemented as appropriate)   06/08/19 0811   Coping/Psychosocial   Plan of Care Reviewed With patient   OTHER   Outcome Summary Pt with current evolving symptoms that have decreased IADL I. Pt with distall weakness in R UE and decreased L periphreal and motor coordination skills. Pt should be cleared by MD to drive and return to work.        Problem: Stroke (Ischemic) (Adult)  Goal: Signs and Symptoms of Listed Potential Problems Will be Absent, Minimized or Managed (Stroke)  Outcome: Ongoing (interventions implemented as appropriate)   06/08/19 0811   Goal/Outcome Evaluation   Problems Assessed (Stroke (Ischemic)) cognitive impairment;communication impairment;muscle tone abnormal;motor/sensory impairment;acute neurologic deterioration   Problems Assessed (Stroke (Ischemic)) motor/sensory impairment

## 2019-06-08 NOTE — PLAN OF CARE
Problem: Patient Care Overview  Goal: Plan of Care Review  Outcome: Outcome(s) achieved Date Met: 06/08/19 06/08/19 1325   Coping/Psychosocial   Plan of Care Reviewed With patient;spouse   Plan of Care Review   Progress improving   OTHER   Outcome Summary NIHSS=0, all TIA symptoms resolved. VSS. Home today w/ wife. Discharge teaching done. Pt to see PCP for clearance to drive or to return to work, per Dr. Lucas's order.     Goal: Discharge Needs Assessment  Outcome: Outcome(s) achieved Date Met: 06/08/19 06/08/19 1325   Discharge Needs Assessment   Readmission Within the Last 30 Days no previous admission in last 30 days   Concerns to be Addressed no discharge needs identified;denies needs/concerns at this time   Patient/Family Anticipates Transition to home with family   Patient/Family Anticipated Services at Transition none   Transportation Concerns car, none   Transportation Anticipated family or friend will provide   Anticipated Changes Related to Illness none   Equipment Needed After Discharge none   Current Discharge Risk [facility]   Disability   Equipment Currently Used at Home none

## 2019-06-08 NOTE — THERAPY DISCHARGE NOTE
Acute Care - Speech Language Pathology Initial Eval/Discharge  [facility]     Patient Name: Jaswant Gonzales  : 1955  MRN: [id number]  Today's Date: 2019  Onset of Illness/Injury or Date of Surgery: 19     Referring Physician: MD Jose Alberto      Admit Date: 2019     Visit Dx:    ICD-10-CM ICD-9-CM   1. Impaired mobility and ADLs Z74.09 799.89   2. Impaired functional mobility, balance, gait, and endurance Z74.09 V49.89     Patient Active Problem List   Diagnosis   • Transient ischemic attack (TIA)   • Essential hypertension   • TIA (transient ischemic attack)   • Severe stenosis of cervical spine on MRI   • CKD (chronic kidney disease) stage 3, GFR 30-59 ml/min (CMS/[facility])     Past Medical History:   Diagnosis Date   • Hypertension      No past surgical history on file.       SLP EVALUATION (last 72 hours)      SLP SLC Evaluation     Row Name 19 1045                   Communication Assessment/Intervention    Document Type  evaluation  -HG        Subjective Information  no complaints  -HG        Patient Observations  alert;cooperative  -        Patient/Family Observations  Spouse present with no concerns  -HG        Patient Effort  good  -HG           General Information    Patient Profile Reviewed  yes  -HG        Pertinent History Of Current Problem  Pt is a 64 y.o male with hx of HTN who presented from [facility] due to concern for stroke. He had three episodes on 19 of right sided weakness and numbness, and the last episode had associated aphasia. CT and MRI and MRA all negative. Slurred speech resolved.  -HG        Precautions/Limitations, Vision  WFL;for purposes of eval  -HG        Precautions/Limitations, Hearing  WFL;for purposes of eval  -HG        Prior Level of Function-Communication  WFL  -        Plans/Goals Discussed with  patient;spouse/S.O.  -HG        Barriers to Rehab  none identified  -HG        Patient's Goals for Discharge  patient did not state  -HG        Family Goals  for Discharge  family did not state  -           Pain Scale: Numbers Pre/Post-Treatment    Pain Scale: Numbers, Pretreatment  0/10 - no pain  -        Pain Scale: Numbers, Post-Treatment  0/10 - no pain  -           Motor Speech Assessment/Intervention    Motor Speech Function  WFL  -           Cursory Voice Assessment/Intervention    Quality and Resonance (Voice)  WF  -           Cognitive Assessment Intervention- SLP    Cognitive Function (Cognition)  WF  -        Orientation Status (Cognition)  [facility]  -        Memory (Cognitive)  simple;immediate;functional;[facility]  -        Attention (Cognitive)  sustained;[facility]  -        Thought Organization (Cognitive)  concrete divergent;[facility]  -        Cognition, Comment  Pt and wife have no concerns regarding pt's speech, language or memory/cognitive skills. Pt presents at a pre-morbid level.   -           SLP Clinical Impressions    SLP Diagnosis  Fxnl Speech and Speech Language  -           Recommendations    Therapy Frequency (SLP SLC)  evaluation only  -           SLP Discharge Summary    Discharge Destination  home  -          User Key  (r) = Recorded By, (t) = Taken By, (c) = Cosigned By    Initials Name Effective Dates    Lisa Barrow MS [facility]-SLP 06/22/15 -            EDUCATION  The patient has been educated in the following areas:   Cognitive Impairment Communication Impairment.      SLP Recommendation and Plan  SLP Diagnosis: Fxnl Speech and Speech Language        SLP Diagnosis: Fxnl Speech and Speech Language             Plan of Care Reviewed With: patient, spouse  Plan of Care Review  Plan of Care Reviewed With: patient, spouse              Time Calculation:   Time Calculation- SLP     Row Name 06/08/19 7426             Time Calculation- SLP    SLP Start Time  1045  -      SLP Received On  06/08/19  -        User Key  (r) = Recorded By, (t) = Taken By, (c) = Cosigned By    Initials Name Provider Type    Lisa Barrow MS  CCC-SLP Speech and Language Pathologist          Therapy Charges for Today     Code Description Service Date Service Provider Modifiers Qty    [id number] HC ST EVAL SPEECH AND PROD W LANG  2 6/8/2019 Lisa Baer, MS CCC-SLP GN 1                   SLP Discharge Summary  Discharge Destination: home    Lisa Baer, MS DARIANA-SLP  6/8/2019

## 2019-06-08 NOTE — PLAN OF CARE
Problem: Patient Care Overview  Goal: Plan of Care Review  Outcome: Outcome(s) achieved Date Met: 06/08/19 06/08/19 1150   Coping/Psychosocial   Plan of Care Reviewed With patient;spouse   OTHER   Outcome Summary PT initial eval completed. Pt is independent with all bed mobility, transfers, and gait training x400 without AD. Pt denies numbness and exhibits gross 4/5 strength BLE. Will dc from IPPT d/t high level of mobility and WFL BLE strength. Recommend home with assist as needed.

## 2019-06-08 NOTE — THERAPY DISCHARGE NOTE
Acute Care - Physical Therapy Initial Eval/Discharge  [facility]     Patient Name: Jaswant Gonzales  : 1955  MRN: [id number]  Today's Date: 2019   Onset of Illness/Injury or Date of Surgery: 19  Date of Referral to PT: 19  Referring Physician: MD Jose Alberto      Admit Date: 2019    Visit Dx:    ICD-10-CM ICD-9-CM   1. Impaired mobility and ADLs Z74.09 799.89   2. Impaired functional mobility, balance, gait, and endurance Z74.09 V49.89     Patient Active Problem List   Diagnosis   • Transient ischemic attack (TIA)   • Essential hypertension   • Renal insufficiency   • TIA (transient ischemic attack)   • Severe stenosis of cervical spine on MRI     Past Medical History:   Diagnosis Date   • Hypertension      No past surgical history on file.       PT ASSESSMENT (last 12 hours)      Physical Therapy Evaluation     Row Name 19 0850          PT Evaluation Time/Intention    Subjective Information  no complaints  -SR     Document Type  evaluation  -SR     Mode of Treatment  physical therapy  -SR     Patient Effort  good  -SR     Symptoms Noted During/After Treatment  none  -SR     Row Name 19 0850          General Information    Patient Profile Reviewed?  yes  -SR     Onset of Illness/Injury or Date of Surgery  19  -SR     Referring Physician  MD Jose Alberto  -SR     Patient Observations  alert;cooperative;agree to therapy  -SR     Patient/Family Observations  spouse present  -SR     General Observations of Patient  Pt is supine in bed, tele, IV.   -SR     Prior Level of Function  independent:;all household mobility;community mobility;ADL's currently works as   -SR     Equipment Currently Used at Home  none  -SR     Pertinent History of Current Functional Problem  Pt admitted s/p acute onset R-sided weakness, numbness, aphasia (x 3 episodes). MRI and CT of head normal.   -SR     Existing Precautions/Restrictions  fall  -SR     Risks Reviewed   patient:;spouse/S.O.:;LOB;nausea/vomiting;dizziness;increased discomfort;change in vital signs;lines disloged  -SR     Benefits Reviewed  patient:;spouse/S.O.:;improve function;increase independence;increase strength;increase balance;decrease pain;decrease risk of DVT;increase knowledge  -SR     Barriers to Rehab  none identified  -SR     Row Name 06/08/19 0850          Home Main Entrance    Number of Stairs, Main Entrance  one  -SR     Row Name 06/08/19 0850          Cognitive Assessment/Intervention- PT/OT    Affect/Mental Status (Cognitive)  WFL  -SR     Orientation Status (Cognition)  oriented x 4  -SR     Follows Commands (Cognition)  WNL  -SR     Personal Safety Interventions  fall prevention program maintained;gait belt;nonskid shoes/slippers when out of bed  -SR     Row Name 06/08/19 0850          Bed Mobility Assessment/Treatment    Bed Mobility Assessment/Treatment  bed mobility (all) activities  -SR     [place] Level (Bed Mobility)  independent  -SR     Row Name 06/08/19 0850          Transfer Assessment/Treatment    Transfer Assessment/Treatment  sit-stand transfer;stand-sit transfer  -SR     Sit-Stand [place] (Transfers)  independent  -SR     Stand-Sit [place] (Transfers)  independent  -SR     Row Name 06/08/19 0850          Gait/Stairs Assessment/Training    Gait/Stairs Assessment/Training  gait/ambulation independence  -SR     [place] Level (Gait)  independent  -SR     Distance in Feet (Gait)  400  -SR     Row Name 06/08/19 0850          General ROM    GENERAL ROM COMMENTS  BLE AROM WNL  -SR     Row Name 06/08/19 0850          MMT (Manual Muscle Testing)    General MMT Comments  BLE grossly 4/5, no significant difference found LLE vs RLE  -SR     Row Name 06/08/19 0850          Balance    Balance  static sitting balance;static standing balance  -SR     Row Name 06/08/19 0850          Static Sitting Balance    Level of [place] (Unsupported Sitting, Static Balance)  independent   -SR     Sitting Position (Unsupported Sitting, Static Balance)  sitting on edge of bed  -SR     Row Name 06/08/19 0850          Static Standing Balance    Level of [place] (Supported Standing, Static Balance)  independent  -SR     Time Able to Maintain Position (Supported Standing, Static Balance)  1 to 2 minutes  -SR     Row Name 06/08/19 0850          Sensory Assessment/Intervention    Sensory General Assessment  no sensation deficits identified  -SR     Row Name 06/08/19 0850          Pain Scale: Numbers Pre/Post-Treatment    Pain Scale: Numbers, Pretreatment  0/10 - no pain  -SR     Pain Scale: Numbers, Post-Treatment  0/10 - no pain  -SR     Row Name 06/08/19 0850          Coping    Observed Emotional State  cooperative  -SR     Verbalized Emotional State  acceptance  -SR     Row Name 06/08/19 0850          Plan of Care Review    Plan of Care Reviewed With  patient;spouse  -SR     Row Name 06/08/19 0850          Physical Therapy Clinical Impression    Date of Referral to PT  06/07/19  -SR     Patient/Family Goals Statement (PT Clinical Impression)  to go home  -SR     Criteria for Skilled Interventions Met (PT Clinical Impression)  no problems identified which require skilled intervention  -SR     Care Plan Review (PT)  evaluation/treatment results reviewed  -SR     Care Plan Review, Other Participant (PT Clinical Impression)  spouse  -SR     Row Name 06/08/19 0850          Vital Signs    Pre Systolic BP Rehab  140  -SR     Pre Treatment Diastolic BP  84  -SR     Post Systolic BP Rehab  139  -SR     Post Treatment Diastolic BP  82  -SR     Pretreatment Heart Rate (beats/min)  57  -SR     Posttreatment Heart Rate (beats/min)  58  -SR     Row Name 06/08/19 0850          Positioning and Restraints    Pre-Treatment Position  in bed  -SR     Post Treatment Position  bed  -SR     In Bed  sitting EOB;notified nsg;call light within reach;encouraged to call for assist;with family/caregiver  -SR     Row Name  06/08/19 0850          Living Environment    Home Accessibility  stairs to enter home  -       User Key  (r) = Recorded By, (t) = Taken By, (c) = Cosigned By    Initials Name Provider Type    SR Lizzette Quinones, PT Physical Therapist          Physical Therapy Education     Title: PT OT SLP Therapies (In Progress)     Topic: Physical Therapy (Done)     Point: Mobility training (Done)     Learning Progress Summary           Patient Acceptance, E,TB, VU by SR at 6/8/2019 11:50 AM   Significant Other Acceptance, E,TB, VU by SR at 6/8/2019 11:50 AM                   Point: Home exercise program (Done)     Learning Progress Summary           Patient Acceptance, E,TB, VU by SR at 6/8/2019 11:50 AM   Significant Other Acceptance, E,TB, VU by SR at 6/8/2019 11:50 AM                   Point: Body mechanics (Done)     Learning Progress Summary           Patient Acceptance, E,TB, VU by SR at 6/8/2019 11:50 AM   Significant Other Acceptance, E,TB, VU by SR at 6/8/2019 11:50 AM                   Point: Precautions (Done)     Learning Progress Summary           Patient Acceptance, E,TB, VU by SR at 6/8/2019 11:50 AM   Significant Other Acceptance, E,TB, VU by SR at 6/8/2019 11:50 AM                               User Key     Initials Effective Dates Name Provider Type Discipline     06/19/15 -  Lizzette Quinones PT Physical Therapist PT                PT Recommendation and Plan  Therapy Frequency (PT Clinical Impression): evaluation only  Plan of Care Reviewed With: patient, spouse  Outcome Summary: PT initial eval completed. Pt is independent with all bed mobility, transfers, and gait training x400 without AD. Pt denies numbness and exhibits gross 4/5 strength BLE. Will dc from IPPT d/t high level of mobility and WFL BLE strength. Recommend home with assist as needed.    Outcome Measures     Row Name 06/08/19 0850 06/08/19 0811          How much help from another person do you currently need...    Turning from your back to  your side while in flat bed without using bedrails?  4  -SR  --     Moving from lying on back to sitting on the side of a flat bed without bedrails?  4  -SR  --     Moving to and from a bed to a chair (including a wheelchair)?  4  -SR  --     Standing up from a chair using your arms (e.g., wheelchair, bedside chair)?  4  -SR  --     Climbing 3-5 steps with a railing?  4  -SR  --     To walk in hospital room?  4  -SR  --     AM-PAC 6 Clicks Score  24  -SR  --        How much help from another is currently needed...    Putting on and taking off regular lower body clothing?  --  4  -AN     Bathing (including washing, rinsing, and drying)  --  4  -AN     Toileting (which includes using toilet bed pan or urinal)  --  4  -AN     Putting on and taking off regular upper body clothing  --  4  -AN     Taking care of personal grooming (such as brushing teeth)  --  4  -AN     Eating meals  --  4  -AN     Score  --  24  -AN        Modified [place] Scale    Modified [place] Scale  --  2 - Slight disability.  Unable to carry out all previous activities but able to look after own affairs without assistance.  -AN        Functional Assessment    Outcome Measure Options  AM-PAC 6 Clicks Basic Mobility (PT)  -SR  AM-PAC 6 Clicks Daily Activity (OT);Modified Zahra  -AN       User Key  (r) = Recorded By, (t) = Taken By, (c) = Cosigned By    Initials Name Provider Type    Tiffany Kuhn, OT Occupational Therapist    Lizzette Levin, PT Physical Therapist           Time Calculation:   PT Charges     Row Name 06/08/19 1153             Time Calculation    Start Time  0850  -SR      PT Received On  06/08/19  -SR         Time Calculation- PT    Total Timed Code Minutes- PT  0 minute(s)  -SR        User Key  (r) = Recorded By, (t) = Taken By, (c) = Cosigned By    Initials Name Provider Type    Lizzette Levin, PT Physical Therapist        Therapy Charges for Today     Code Description Service Date Service Provider Modifiers Qty     [id number]  PT EVAL LOW COMPLEXITY 4 6/8/2019 Lizzette Quinones, PT GP 1          PT G-Codes  Outcome Measure Options: AM-PAC 6 Clicks Basic Mobility (PT)  AM-PAC 6 Clicks Score: 24  Score: 24  Modified [place] Scale: 2 - Slight disability.  Unable to carry out all previous activities but able to look after own affairs without assistance.         Lizzette Quinones, PT  6/8/2019

## 2019-06-08 NOTE — H&P
[facility] Medicine Services  HISTORY AND PHYSICAL    Patient Name: Jaswant Gonzales  : 1955  MRN: [id number]  Primary Care Physician: Cherelle Alberto MD  Date of admission: 2019      Subjective   Subjective     Chief Complaint:  Right sided weakness/numbness, aphasia    HPI:  Jaswant Gonzales is a 64 y.o. male with hx of HTN who presents from [place] due to concern for stroke. He had three episodes today of right sided weakness and numbness, and the last episode had associated aphasia. CT head normal at OSH. Given ASA and statin and transferred to [facility] for further workup. On arrival to the [facility], CT perfusion/CTA head and neck were ordered by Neurology however he was noted to have a Cr of 1.8 at the OSH and so these studies were switched to MRA head and neck, MRI brain. These studies have now returned normal. He has returned to baseline and has not had any recurrent episodes.     Review of Systems     Otherwise complete ROS reviewed and is negative except as mentioned in the HPI.    Personal History     Past Medical History:   Diagnosis Date   • Hypertension        No past surgical history on file.    Family History: family history is not on file. Otherwise pertinent FHx was reviewed and unremarkable.     Social History:  reports that he has never smoked. He uses smokeless tobacco. Drug use questions deferred to the physician. He reports that he does not drink alcohol.  Social History     Social History Narrative   • Not on file       Medications:    Available home medication information reviewed.  Medications Prior to Admission   Medication Sig Dispense Refill Last Dose   • aMILoride (MIDAMOR) 5 MG tablet Take 5 mg by mouth Daily.   2019 at Unknown time   • lansoprazole (PREVACID) 30 MG capsule Take 30 mg by mouth 2 (Two) Times a Day.   2019 at Unknown time   • LORazepam (ATIVAN) 1 MG tablet Take 1 mg by mouth 3 (Three) Times a Day As Needed for Anxiety.   2019 at  Unknown time   • magnesium oxide (MAGOX) 400 (241.3 Mg) MG tablet tablet Take 400 mg by mouth Daily.   6/7/2019 at Unknown time   • Multiple Vitamins-Minerals (MULTIVITAMIN MEN 50+) tablet Take 1 tablet by mouth Daily. Centrum      • potassium chloride (K-DUR) 10 MEQ CR tablet Take 10 mEq by mouth 4 (Four) Times a Day. Take 5 tablets by mouth 4 times daily as directed   6/7/2019 at Unknown time   • spironolactone (ALDACTONE) 50 MG tablet Take 50 mg by mouth 4 (Four) Times a Day.   6/7/2019 at Unknown time   • sucralfate (CARAFATE) 1 g tablet Take 1 g by mouth 3 (Three) Times a Day.   6/7/2019 at Unknown time       No Known Allergies    Objective   Objective     Vital Signs:   Temp:  [98 °F (36.7 °C)] 98 °F (36.7 °C)  Heart Rate:  [54-64] 54  Resp:  [14-16] 16  BP: (136-150)/(95) 136/95   Total (NIH Stroke Scale): 0    Physical Exam   Constitutional: Awake, alert  Eyes: PERRLA, sclerae anicteric, no conjunctival injection  HENT: NCAT, mucous membranes moist  Neck: Supple, no thyromegaly, no lymphadenopathy, trachea midline  Respiratory: Clear to auscultation bilaterally, nonlabored respirations   Cardiovascular: RRR, no murmurs, rubs, or gallops, palpable pedal pulses bilaterally  Gastrointestinal: Positive bowel sounds, soft, nontender, nondistended  Musculoskeletal: No bilateral ankle edema, no clubbing or cyanosis to extremities  Psychiatric: Appropriate affect, cooperative  Neurologic: Oriented x 3, strength symmetric in all extremities, Cranial Nerves grossly intact to confrontation, speech clear  Skin: No rashes      Results Reviewed:  I have personally reviewed current lab, radiology, and data and agree.              Invalid input(s):  ALKPHOS  CrCl cannot be calculated (No order found.).  Brief Urine Lab Results     None        Imaging Results (last 24 hours)     Procedure Component Value Units Date/Time    MRI Angiogram Neck With Contrast [[id number]] Collected:  06/07/19 2230     Updated:  06/07/19 2232     Narrative:       MRA Neck W 6/7/2019    INDICATION:    3 episodes of right hemiparesthesias, hemianesthesia today each lasting 3 to 5 minutes in length. During one episode, the patient experienced right facial paresis and aphasia.    TECHNIQUE:   Axial time-of-flight MRA of the neck with 3-dimensional reformats.  Evaluation for a significant carotid arterial stenosis is based on the NASCET criteria.    COMPARISON:    None available.    FINDINGS:  The bilateral carotid and vertebral artery systems are widely patent. There is no evidence of stenosis or dissection.      Impression:       No acute findings.    Signer Name: Neil Denney MD   Signed: 6/7/2019 10:30 PM   Workstation Name: [id number]       MRI Cervical Spine Without Contrast [[id number]] Collected:  06/07/19 2228     Updated:  06/07/19 2230    Narrative:       MRI Spine Cervical WO 6/7/2019 10:49 PM    SIGNS AND SYMPTOMS:  Three episodes of right hemiparesis/hemianesthesia today, each lasting about 3-5 minutes; During one episode the patient experienced right facial paresis and aphasia; R/o stroke; No reported hx of CVA, seizures, head injury, or cancer.    COMPARISON:  none    TECHNIQUE:  Multiplanar multisequence MR imaging of the cervical spine was obtained without contrast.    FINDINGS:    Cervical lordosis is straightened. The cervical vertebral bodies maintain normal height. The paraspinal soft tissues are normal in appearance.    At the C2/3 level, No spinal canal or neuroforaminal narrowing is seen.    At the C3/4 level, the disc is desiccated. No sizable disc bulge is seen. There is bilateral uncovertebral spurring and facet arthropathy. On the right, this causes mild neuroforaminal narrowing on the left uncovertebral spurring and facet arthropathy  causes moderate neuroforaminal stenosis.    At the C4/5 level, the disc is desiccated and there is disc space height loss. There is a central zone protrusion of disc-osteophyte complex causing severe  spinal canal stenosis. There is bilateral uncovertebral spurring on the right, this causes mild  neuroforaminal stenosis. On the left, this causes mild neuroforaminal stenosis.    At the C5/6 level, the disc is desiccated and there is a broad-based protrusion of disc-osteophyte complex causing severe spinal canal stenosis. Bilateral uncovertebral spurring and facet arthropathy is present. On the right this causes moderate neural  foraminal stenosis. On the left, this causes severe neuroforaminal stenosis.    At the C6/7 level, the disc is desiccated and there is disc space height loss. There is a minimal right paracentral protrusion of disc-osteophyte complex causing mild spinal canal stenosis. Bilateral uncovertebral spurring and facet arthropathy is  present. On the right, this causes mild stenosis. On the left this causes mild stenosis.      Impression:       1.  Severe spinal canal stenosis at C4-5 and C5-6 due to a broad-based disc protrusion of disc-osteophyte complex. There is no altered signal seen within the cord to indicate the presence of myelomalacia on the current examination.  2.  Multilevel neural foraminal stenosis largely owing to uncovertebral spurring facet arthropathy. This is most severe at C5-6 with a moderate right and severe left neuroforaminal stenosis due to uncovertebral spurring and facet arthropathy.    Additional findings as discussed above.    Signer Name: Angel Gordillo MD   Signed: 6/7/2019 10:28 PM   Workstation Name: [id number]       MRI Angiogram Head Without Contrast [[id number]] Collected:  06/07/19 2226     Updated:  06/07/19 2229    Narrative:       MRA Head WO 6/7/2019    INDICATION:    3 episodes of right hemiparesis, hemianesthesia today each lasting 3 to 5 minutes. During one episode the patient experienced right facial paresis and aphasia.    TECHNIQUE:   Axial time-of-flight MRA of the head with 3-dimensional reformats.    COMPARISON:    None  available.    FINDINGS:  There is no evidence of any significant stenosis or aneurysm. Incidental note is made of fetal origin of the right posterior cerebral artery. Incidental note is made of bilateral maxillary and frontal sinusitis.      Impression:       No evidence of any critical intracranial stenosis or aneurysm.    Signer Name: Neil Denney MD   Signed: 6/7/2019 10:26 PM   Workstation Name: [id number]       MRI Brain Without Contrast [[id number]] Collected:  06/07/19 2219     Updated:  06/07/19 2220    Narrative:       MRI Brain WO     INDICATION:    Three episodes of right hemiparesis/hemianesthesia today, each lasting about 3-5 minutes; During one episode the patient experienced right facial paresis and aphasia; R/o stroke; No reported hx of CVA, seizures, head injury, or cancer.    Brain MRI:  No hemorrhage. No acute stroke. No mass lesion. There is mild ex vacuo ventricular and sulcal dilation, likely age-related. Scattered non-specific white matter changes, the differential for which includes (but is not limited to) early chronic  microangiopathic or small vessel ischemic disease, migraine-related changes, demyelinating disease such as multiple sclerosis, vasculitis, and Lyme disease. There is mild mucosal thickening seen within the ethmoid air cells. The right frontal air cell is  almost completely occluded.    This is a preliminary wet read provided approximately 10:18 PM 6/7/2019. Final interpretation will be provided by neuroradiology. Please refer to final interpretation for detailed findings and any further recommendations.     XR Orbits 4+ View [[id number]] Collected:  06/07/19 1841     Updated:  06/07/19 1846    Narrative:          EXAMINATION: XR ORBITS 4+ VW-      INDICATION: pt had metal in his eye in the past      COMPARISON: NONE     FINDINGS: History indicates metal shaving in the right eye years ago  from drill bit. No metallic foreign body is seen regional to the orbits  or elsewhere on  today's study. Included paranasal sinuses appear  normally developed and clear. Bony structures appear grossly intact.           Impression:       No visible metallic foreign body.     This report was finalized on 6/7/2019 6:43 PM by DR. Yousuf Ga MD.                Assessment/Plan   Assessment / Plan     Active Hospital Problems    Diagnosis POA   • **Transient ischemic attack (TIA) [G45.9] Yes   • Essential hypertension [I10] Yes   • Renal insufficiency [N28.9] Yes       65 yo M with hx of HTN presents due to episodes of right sided weakness/numbness and aphasia.    PLAN:  --MRI brain, MRA head/neck are unremarkable. Probable TIA. Continue ASA and statin. Check Echo with bubble. Dr. Ku is following.  --Possible CKD?? Will hold Aldactone and gently hydrate tonight. Recheck labs in AM.     DVT prophylaxis:  mechanical    CODE STATUS:    Code Status and Medical Interventions:   Ordered at: 06/07/19 5687     Code Status:    CPR     Medical Interventions (Level of Support Prior to Arrest):    Full       Admission Status:  I believe this patient meets OBSERVATION status, however if further evaluation or treatment plans warrant, status may change.  Based upon current information, I predict patient's care encounter to be less than or equal to 2 midnights.        Electronically signed by Zaida Abrams MD, 06/07/19, 11:03 PM

## 2019-06-08 NOTE — DISCHARGE SUMMARY
[facility] Medicine Services  DISCHARGE SUMMARY    Patient Name: Jaswant Gonzales  : 1955  MRN: [id number]    Date of Admission: 2019  Date of Discharge:  2019  Primary Care Physician: Cherelle Alberto MD    Hospital Course     Presenting Problem:   CVA (cerebral vascular accident) (CMS/[facility]) [I63.9]  CVA (cerebral vascular accident) (CMS/HCC) [I63.9]  TIA (transient ischemic attack) [G45.9]    Active Hospital Problems    Diagnosis  POA   • **Transient ischemic attack (TIA) [G45.9]  Yes   • Severe stenosis of cervical spine on MRI [M48.02]  Yes   • CKD (chronic kidney disease) stage 3, GFR 30-59 ml/min (CMS/[facility]) [N18.3]  Yes   • Essential hypertension [I10]  Yes   • TIA (transient ischemic attack) [G45.9]  Yes      Resolved Hospital Problems   No resolved problems to display.          Hospital Course:  Jaswant Gonzales is a 64 y.o. male with past medical history of essential hypertension, chronic kidney disease from [facility] who was transferred here due to concern for stroke.  He has had 3 episodes of right-sided weakness and numbness and the most recent episode was hemiparesis, right facial paralysis, and associated with word finding difficulty.  On arrival here a CT perfusion scan was ordered but was unable to be performed due to elevated creatinine.  His MRI was negative for stroke.  Neurology feels that he likely had multiple TIAs.  Patient was found to have hyperlipidemia with  and triglycerides of 159.  He was started on aspirin and high-dose statin.  He was found to have essential hypertension as well with blood pressures mostly in the 130s to 150s.  We recommend he start low-dose amlodipine at discharge and have primary care follow-up.  He was also noted to and cervical spine stenosis on MRI study.  Neurology consult team recommends outpatient neurosurgery evaluation.  Although cervical spine findings could contribute to some arm weakness it does not  "explain his aphasia and facial paralysis that was reported.  Patient is saying he is back to normal and requesting discharge home today.  Occupational Therapy felt per their note that he was with \"distall weakness in R UE and decreased L periphreal and motor coordination skills.\"  They recommended primary care follow-up for clearance for driving.  On my examination patient has equal and normal bilateral upper extremity and lower extremity strength and finger-to-nose examinations.  Neurology agrees with this assessment.  He has been cleared for discharge home and agrees with plan.    At the time of discharge patient was told to take all medications as prescribed, keep all follow-up appointments, and call their doctor or return to the hospital with any worsening or concerning symptoms.    Please note that dragon voice recognition software was used to create this note and that transcription errors are possible.    Discharge Follow Up Recommendations for labs/diagnostics:  Recommend follow-up with primary care provider within 1 week for general follow-up and to discuss outpatient referral to local neurosurgeon and to discuss clearance for driving.    Day of Discharge     HPI:   Currently patient states he is completely back to his usual baseline.  He denies any weakness in his arms or legs at this time.  He is eager for discharge home.  He agrees to follow-up with a primary care provider and wishes to have a local neurosurgery follow-up as opposed to a neurosurgery evaluation at [place].  His family is at the bedside and are also agreeable with the plan.  He denies any facial droop or other concerning findings.    Review of Systems  No current fevers or chills  No current shortness of breath or cough  No current nausea, vomiting, or diarrhea  No current chest pain or palpitations    Vital Signs:   Temp:  [97.2 °F (36.2 °C)-98.2 °F (36.8 °C)] 97.8 °F (36.6 °C)  Heart Rate:  [54-74] 61  Resp:  [14-18] 16  BP: " (123-150)/(71-95) 131/76     Physical Exam:  Constitutional:Awake, alert  HENT: NCAT, mucous membranes moist, neck supple  Respiratory: Clear to auscultation bilaterally, respiratory effort normal, nonlabored breathing   Cardiovascular: RRR, S1, S2, normal radial pulses  Gastrointestinal: Positive bowel sounds, soft, nontender, nondistended  Musculoskeletal: Normal musculature for age, no lower extremity edema, BMI  Psychiatric: Appropriate affect, cooperative, conversational  Neurologic: No slurred speech or facial droop, follows commands, not confused   Skin: No rashes or jaundice, warm      Pertinent  and/or Most Recent Results     Results from last 7 days   Lab Units 06/08/19  0459   WBC 10*3/mm3 7.32   HEMOGLOBIN g/dL 15.6   HEMATOCRIT % 47.3   PLATELETS 10*3/mm3 234   SODIUM mmol/L 136   POTASSIUM mmol/L 4.0   CHLORIDE mmol/L 95*   CO2 mmol/L 25.0   BUN mg/dL 19   CREATININE mg/dL 1.35*   GLUCOSE mg/dL 92   CALCIUM mg/dL 9.2           Invalid input(s): PROT, LABALBU  Results from last 7 days   Lab Units 06/08/19  0459   CHOLESTEROL mg/dL 203*   TRIGLYCERIDES mg/dL 159*   HDL CHOL mg/dL 37*     Results from last 7 days   Lab Units 06/08/19  0542   HEMOGLOBIN A1C % 5.30       Brief Urine Lab Results     None          Microbiology Results Abnormal     None          Imaging Results (all)     Procedure Component Value Units Date/Time    MRI Angiogram Neck With Contrast [[id number]] Collected:  06/07/19 2230     Updated:  06/07/19 2232    Narrative:       MRA Neck W 6/7/2019    INDICATION:    3 episodes of right hemiparesthesias, hemianesthesia today each lasting 3 to 5 minutes in length. During one episode, the patient experienced right facial paresis and aphasia.    TECHNIQUE:   Axial time-of-flight MRA of the neck with 3-dimensional reformats.  Evaluation for a significant carotid arterial stenosis is based on the NASCET criteria.    COMPARISON:    None available.    FINDINGS:  The bilateral carotid and vertebral  artery systems are widely patent. There is no evidence of stenosis or dissection.      Impression:       No acute findings.    Signer Name: Neil Denney MD   Signed: 6/7/2019 10:30 PM   Workstation Name: [id number]       MRI Cervical Spine Without Contrast [[id number]] Collected:  06/07/19 2228     Updated:  06/07/19 2230    Narrative:       MRI Spine Cervical WO 6/7/2019 10:49 PM    SIGNS AND SYMPTOMS:  Three episodes of right hemiparesis/hemianesthesia today, each lasting about 3-5 minutes; During one episode the patient experienced right facial paresis and aphasia; R/o stroke; No reported hx of CVA, seizures, head injury, or cancer.    COMPARISON:  none    TECHNIQUE:  Multiplanar multisequence MR imaging of the cervical spine was obtained without contrast.    FINDINGS:    Cervical lordosis is straightened. The cervical vertebral bodies maintain normal height. The paraspinal soft tissues are normal in appearance.    At the C2/3 level, No spinal canal or neuroforaminal narrowing is seen.    At the C3/4 level, the disc is desiccated. No sizable disc bulge is seen. There is bilateral uncovertebral spurring and facet arthropathy. On the right, this causes mild neuroforaminal narrowing on the left uncovertebral spurring and facet arthropathy  causes moderate neuroforaminal stenosis.    At the C4/5 level, the disc is desiccated and there is disc space height loss. There is a central zone protrusion of disc-osteophyte complex causing severe spinal canal stenosis. There is bilateral uncovertebral spurring on the right, this causes mild  neuroforaminal stenosis. On the left, this causes mild neuroforaminal stenosis.    At the C5/6 level, the disc is desiccated and there is a broad-based protrusion of disc-osteophyte complex causing severe spinal canal stenosis. Bilateral uncovertebral spurring and facet arthropathy is present. On the right this causes moderate neural  foraminal stenosis. On the left, this causes severe  neuroforaminal stenosis.    At the C6/7 level, the disc is desiccated and there is disc space height loss. There is a minimal right paracentral protrusion of disc-osteophyte complex causing mild spinal canal stenosis. Bilateral uncovertebral spurring and facet arthropathy is  present. On the right, this causes mild stenosis. On the left this causes mild stenosis.      Impression:       1.  Severe spinal canal stenosis at C4-5 and C5-6 due to a broad-based disc protrusion of disc-osteophyte complex. There is no altered signal seen within the cord to indicate the presence of myelomalacia on the current examination.  2.  Multilevel neural foraminal stenosis largely owing to uncovertebral spurring facet arthropathy. This is most severe at C5-6 with a moderate right and severe left neuroforaminal stenosis due to uncovertebral spurring and facet arthropathy.    Additional findings as discussed above.    Signer Name: Angel Gordillo MD   Signed: 6/7/2019 10:28 PM   Workstation Name: [id number]       MRI Angiogram Head Without Contrast [[id number]] Collected:  06/07/19 2226     Updated:  06/07/19 2229    Narrative:       MRA Head WO 6/7/2019    INDICATION:    3 episodes of right hemiparesis, hemianesthesia today each lasting 3 to 5 minutes. During one episode the patient experienced right facial paresis and aphasia.    TECHNIQUE:   Axial time-of-flight MRA of the head with 3-dimensional reformats.    COMPARISON:    None available.    FINDINGS:  There is no evidence of any significant stenosis or aneurysm. Incidental note is made of fetal origin of the right posterior cerebral artery. Incidental note is made of bilateral maxillary and frontal sinusitis.      Impression:       No evidence of any critical intracranial stenosis or aneurysm.    Signer Name: Neil Denney MD   Signed: 6/7/2019 10:26 PM   Workstation Name: [id number]       MRI Brain Without Contrast [[id number]] Collected:  06/07/19 2219     Updated:  06/07/19  2220    Narrative:       MRI Brain WO     INDICATION:    Three episodes of right hemiparesis/hemianesthesia today, each lasting about 3-5 minutes; During one episode the patient experienced right facial paresis and aphasia; R/o stroke; No reported hx of CVA, seizures, head injury, or cancer.    Brain MRI:  No hemorrhage. No acute stroke. No mass lesion. There is mild ex vacuo ventricular and sulcal dilation, likely age-related. Scattered non-specific white matter changes, the differential for which includes (but is not limited to) early chronic  microangiopathic or small vessel ischemic disease, migraine-related changes, demyelinating disease such as multiple sclerosis, vasculitis, and Lyme disease. There is mild mucosal thickening seen within the ethmoid air cells. The right frontal air cell is  almost completely occluded.    This is a preliminary wet read provided approximately 10:18 PM 6/7/2019. Final interpretation will be provided by neuroradiology. Please refer to final interpretation for detailed findings and any further recommendations.     XR Orbits 4+ View [[id number]] Collected:  06/07/19 1841     Updated:  06/07/19 1846    Narrative:          EXAMINATION: XR ORBITS 4+ VW-      INDICATION: pt had metal in his eye in the past      COMPARISON: NONE     FINDINGS: History indicates metal shaving in the right eye years ago  from drill bit. No metallic foreign body is seen regional to the orbits  or elsewhere on today's study. Included paranasal sinuses appear  normally developed and clear. Bony structures appear grossly intact.           Impression:       No visible metallic foreign body.     This report was finalized on 6/7/2019 6:43 PM by DR. Yousuf Ga MD.                       Results for orders placed during the hospital encounter of 06/07/19   Adult Transthoracic Echo Complete W/ Cont if Necessary Per Protocol (With Agitated Saline)    Narrative · Estimated EF = 50%.  · Left ventricular systolic function  is mildly decreased.  · Left ventricular diastolic dysfunction (grade I) consistent with   impaired relaxation.  · No IV Lumason used.  · Normal right ventricular cavity size, wall thickness, systolic function   and septal motion noted.  · Saline test results are negative.  · No evidence of pulmonary hypertension is present.  · There is no evidence of pericardial effusion.  · No significant structural valvular abnormality demonstrated.            Discharge Details        Discharge Medications      New Medications      Instructions Start Date   amLODIPine 2.5 MG tablet  Commonly known as:  NORVASC   2.5 mg, Oral, Every 24 Hours Scheduled      aspirin 81 MG chewable tablet   81 mg, Oral, Daily      atorvastatin 80 MG tablet  Commonly known as:  LIPITOR   40 mg, Oral, Nightly         Continue These Medications      Instructions Start Date   aMILoride 5 MG tablet  Commonly known as:  MIDAMOR   5 mg, Oral, Daily      lansoprazole 30 MG capsule  Commonly known as:  PREVACID   30 mg, Oral, 2 Times Daily      LORazepam 1 MG tablet  Commonly known as:  ATIVAN   1 mg, Oral, 3 Times Daily PRN      magnesium oxide 400 (241.3 Mg) MG tablet tablet  Commonly known as:  MAGOX   400 mg, Oral, Daily      MULTIVITAMIN MEN 50+ tablet   1 tablet, Oral, Daily, Centrum       potassium chloride 10 MEQ CR tablet  Commonly known as:  K-DUR   10 mEq, Oral, 4 Times Daily, Take 5 tablets by mouth 4 times daily as directed       spironolactone 50 MG tablet  Commonly known as:  ALDACTONE   50 mg, Oral, 4 Times Daily      sucralfate 1 g tablet  Commonly known as:  CARAFATE   1 g, Oral, 3 Times Daily             No Known Allergies      Discharge Disposition:  Home or Self Care    Discharge Diet:  Diet Order   Procedures   • Diet Regular; Cardiac         Discharge Activity:   Activity Instructions     Activity as Tolerated              CODE STATUS:    Code Status and Medical Interventions:   Ordered at: 06/07/19 8361     Code Status:    CPR      Medical Interventions (Level of Support Prior to Arrest):    Full         Additional Instructions for the Follow-ups that You Need to Schedule     Discharge Follow-up with PCP   As directed       Currently Documented PCP:    Cherelle Alberto MD    PCP Phone Number:    [phone]     Follow Up Details:  1 week follow up, and discuss local neurosurgery clinic referral for cervical spine stenosis seen on MRI as we discussed.  Primary care will need to clear you to return to drive at follow-up           MRI shows severe spinal stenosis and neurology recommends outpatient neurosurgery follow-up.  I have recommended patient discuss this with his primary care provider at follow-up in 1 week so that he can get referred to a local specialist in his area.  He agrees with this plan.  Occupational Therapy also recommended physician clearance to return to drive due to peripheral and motor coordination skills per their note and patient has been notified he needs to wait to drive until cleared.  Recommend primary care assess for clearance at follow-up.    Time Spent on Discharge:  40 minutes    Electronically signed by Leroy Lucas MD, 06/08/19, 12:20 PM.

## 2019-06-09 ENCOUNTER — READMISSION MANAGEMENT (OUTPATIENT)
Dept: CALL CENTER | Facility: HOSPITAL | Age: 64
End: 2019-06-09

## 2019-06-09 NOTE — OUTREACH NOTE
Prep Survey      Responses   Facility patient discharged from?  [facility]   Is patient eligible?  Yes   Discharge diagnosis  Transient ischemic attack    Does the patient have one of the following disease processes/diagnoses(primary or secondary)?  Stroke (TIA)   Does the patient have Home health ordered?  No   Is there a DME ordered?  No   Prep survey completed?  Yes          Rebekah Garcia RN

## 2019-06-10 NOTE — THERAPY DISCHARGE NOTE
Acute Care - Occupational Therapy Discharge Summary  [facility]     Patient Name: Jaswant Gonzales  : 1955  MRN: [id number]    Today's Date: 6/10/2019  Onset of Illness/Injury or Date of Surgery: 19    Date of Referral to OT: 19  Referring Physician: MD Jose Alberto      Admit Date: 2019        OT Recommendation and Plan    Visit Dx:    ICD-10-CM ICD-9-CM   1. Impaired mobility and ADLs Z74.09 799.89   2. Impaired functional mobility, balance, gait, and endurance Z74.09 V49.89               Rehab Goal Summary     Row Name 06/10/19 1445             Strength Goal 1 (OT)    Progress/Outcome (Strength Goal 1, OT)  goal not met;discharged from facility  -MICHAEL         Coordination Goal 1 (OT)    Progress/Outcomes (Coordination Goal 1, OT)  goal not met;discharged from facility  -MICHAEL         Problem Specific Goal 1 (OT)    Progress/Outcome (Problem Specific Goal 1, OT)  goal not met;discharged from facility  -MICHAEL        User Key  (r) = Recorded By, (t) = Taken By, (c) = Cosigned By    Initials Name Provider Type Discipline    Jennifer Gee, OT Occupational Therapist OT          Outcome Measures     Row Name 19 0850 19 0811          How much help from another person do you currently need...    Turning from your back to your side while in flat bed without using bedrails?  4  -SR  --     Moving from lying on back to sitting on the side of a flat bed without bedrails?  4  -SR  --     Moving to and from a bed to a chair (including a wheelchair)?  4  -SR  --     Standing up from a chair using your arms (e.g., wheelchair, bedside chair)?  4  -SR  --     Climbing 3-5 steps with a railing?  4  -SR  --     To walk in hospital room?  4  -SR  --     AM-PAC 6 Clicks Score  24  -SR  --        How much help from another is currently needed...    Putting on and taking off regular lower body clothing?  --  4  -AN     Bathing (including washing, rinsing, and drying)  --  4  -AN     Toileting (which includes using  toilet bed pan or urinal)  --  4  -AN     Putting on and taking off regular upper body clothing  --  4  -AN     Taking care of personal grooming (such as brushing teeth)  --  4  -AN     Eating meals  --  4  -AN     Score  --  24  -AN        Modified Zahra Scale    Modified Zahra Scale  --  2 - Slight disability.  Unable to carry out all previous activities but able to look after own affairs without assistance.  -AN        Functional Assessment    Outcome Measure Options  AM-PAC 6 Clicks Basic Mobility (PT)  -SR  AM-PAC 6 Clicks Daily Activity (OT);Modified Zahra  -AN       User Key  (r) = Recorded By, (t) = Taken By, (c) = Cosigned By    Initials Name Provider Type    AN Tiffany Murray, OT Occupational Therapist    SR Lizzette Quinones, PT Physical Therapist          Therapy Suggested Charges     Code   Minutes Charges    None                 OT Discharge Summary  Reason for Discharge: Discharge from facility  Outcomes Achieved: Discharge from facility occurred on same date as evluation  Discharge Destination: Home      Jennifer Gilmore OT  6/10/2019

## 2019-06-11 ENCOUNTER — READMISSION MANAGEMENT (OUTPATIENT)
Dept: CALL CENTER | Facility: HOSPITAL | Age: 64
End: 2019-06-11

## 2019-06-11 NOTE — OUTREACH NOTE
Stroke Week 1 Survey      Responses   Facility patient discharged from?  [facility]   Does the patient have one of the following disease processes/diagnoses(primary or secondary)?  Stroke (TIA)   Is there a successful TCM telephone encounter documented?  No   Week 1 attempt successful?  Yes   Call start time  1241   Call end time  1242   Discharge diagnosis  Transient ischemic attack    Is patient permission given to speak with other caregiver?  No [Patient only ]   Meds reviewed with patient/caregiver?  Yes   Is the patient having any side effects they believe may be caused by any medication additions or changes?  No   Does the patient have all medications ordered at discharge?  Yes   Is the patient taking all medications as directed (includes completed medication regime)?  Yes   Does the patient have a primary care provider?   Yes   Does the patient have an appointment with their PCP within 7 days of discharge?  Yes   Has the patient kept scheduled appointments due by today?  N/A   Psychosocial issues?  No   Does the patient require any assistance with activities of daily living such as eating, bathing, dressing, walking, etc.?  No   Does the patient have any residual symptoms from stroke/TIA?  No   Does the patient understand the diet ordered at discharge?  Yes   Did the patient receive a copy of their discharge instructions?  Yes   Nursing interventions  Reviewed instructions with patient   What is the patient's perception of their health status since discharge?  Improving   Nursing interventions  Nurse provided patient education   Is the patient able to teach back FAST for Stroke?  Yes   Is the patient/caregiver able to teach back the risk factors for a stroke?  History of TIAs, High blood pressure-goal below 120/80, High Cholesterol   Is the patient/caregiver able to teach back signs and symptoms related to disease process for when to call PCP?  Yes   Is the patient/caregiver able to teach back signs and  symptoms related to disease process for when to call 911?  Yes   Is the patient/caregiver able to teach back the hierarchy of who to call/visit for symptoms/problems? PCP, Specialist, Home health nurse, Urgent Care, ED, 911  Yes   Week 1 call completed?  Yes          Keila Fulton RN

## 2019-06-18 ENCOUNTER — READMISSION MANAGEMENT (OUTPATIENT)
Dept: CALL CENTER | Facility: HOSPITAL | Age: 64
End: 2019-06-18

## 2019-06-18 NOTE — OUTREACH NOTE
Stroke Week 2 Survey      Responses   Facility patient discharged from?  [facility]   Does the patient have one of the following disease processes/diagnoses(primary or secondary)?  Stroke (TIA)   Week 2 attempt successful?  Yes   Call start time  1637   Call end time  1641   Discharge diagnosis  Transient ischemic attack    Meds reviewed with patient/caregiver?  Yes   Is the patient having any side effects they believe may be caused by any medication additions or changes?  No   Does the patient have all medications ordered at discharge?  Yes   Is the patient taking all medications as directed (includes completed medication regime)?  Yes   Does the patient have a primary care provider?   Yes   Does the patient have an appointment with their PCP within 7 days of discharge?  Yes   Has the patient kept scheduled appointments due by today?  N/A   Does the patient require any assistance with activities of daily living such as eating, bathing, dressing, walking, etc.?  No   Does the patient have any residual symptoms from stroke/TIA?  No   Residual symptoms comments  He is alittle weak, but otherwise doing well.    Does the patient understand the diet ordered at discharge?  Yes   Did the patient receive a copy of their discharge instructions?  Yes   Nursing interventions  Reviewed instructions with patient   What is the patient's perception of their health status since discharge?  Improving   Nursing interventions  Nurse provided patient education   Is the patient able to teach back FAST for Stroke?  Yes   Is the patient/caregiver able to teach back the risk factors for a stroke?  History of TIAs, High blood pressure-goal below 120/80, High Cholesterol   Is the patient/caregiver able to teach back signs and symptoms related to disease process for when to call PCP?  Yes   Is the patient/caregiver able to teach back signs and symptoms related to disease process for when to call 911?  Yes   Is the patient/caregiver able to  teach back the hierarchy of who to call/visit for symptoms/problems? PCP, Specialist, Home health nurse, Urgent Care, ED, 911  Yes   Additional teach back comments  Patient denies headache, confusion, speech or vision problems, or loss of coordination. If develops seek medical care immediately. Patient verbalized understanding.    Week 2 call completed?  Yes          Alfred Joyce RN

## 2019-09-09 ENCOUNTER — TELEPHONE (OUTPATIENT)
Dept: NEUROLOGY | Facility: CLINIC | Age: 64
End: 2019-09-09

## 2019-09-09 NOTE — TELEPHONE ENCOUNTER
Returned call regarding message received concerning regards to his Rx for amLODIPine (NORVASC) 2.5 MG tablet. Pt states Dr. Ku prescribed this in the E.R. and it's causing weakness and leg cramps. Informed caller that Dr. Ku did not see him in the ED and didn't prescribe this medication. Informed caller to call his provider Dr. Leroy Lucas regarding this medication. Caller stated understanding. Thanks.

## 2019-09-09 NOTE — TELEPHONE ENCOUNTER
I'm sorry, but I'm afraid he has the wrong person. I did not see him in the ED and did not prescribe this medication.

## 2019-09-09 NOTE — TELEPHONE ENCOUNTER
----- Message from Chadwick Solis sent at 9/6/2019  4:40 PM EDT -----  Contact: [phone]  Dr. Ku,    Pt left a vm in regards to his Rx for amLODIPine (NORVASC) 2.5 MG tablet. Pt states Dr. Ku prescribed this in the E.R. And it's causing weakness and leg cramps. Please advise.

## 2019-09-09 NOTE — TELEPHONE ENCOUNTER
----- Message from Chadwick Solis sent at 9/6/2019  4:40 PM EDT -----  Contact: [phone]  Dr. Ku,    Pt left a vm in regards to his Rx for amLODIPine (NORVASC) 2.5 MG tablet. Pt states Dr. Ku prescribed this in the E.R. And it's causing weakness and leg cramps. Please advise.

## 2020-02-12 ENCOUNTER — HOSPITAL ENCOUNTER (OUTPATIENT)
Facility: HOSPITAL | Age: 65
Setting detail: HOSPITAL OUTPATIENT SURGERY
End: 2020-02-12
Attending: INTERNAL MEDICINE | Admitting: INTERNAL MEDICINE

## 2020-02-12 ENCOUNTER — TRANSCRIBE ORDERS (OUTPATIENT)
Dept: ADMINISTRATIVE | Facility: HOSPITAL | Age: 65
End: 2020-02-12

## 2020-02-12 DIAGNOSIS — I20.0 UNSTABLE ANGINA (HCC): Primary | ICD-10-CM

## 2020-02-12 DIAGNOSIS — I20.0 UNSTABLE ANGINA (HCC): ICD-10-CM

## 2020-02-19 ENCOUNTER — APPOINTMENT (OUTPATIENT)
Dept: CARDIOLOGY | Facility: HOSPITAL | Age: 65
End: 2020-02-19

## 2020-02-19 ENCOUNTER — HOSPITAL ENCOUNTER (OUTPATIENT)
Facility: HOSPITAL | Age: 65
Setting detail: HOSPITAL OUTPATIENT SURGERY
Discharge: HOME | End: 2020-02-19
Attending: INTERNAL MEDICINE | Admitting: INTERNAL MEDICINE

## 2020-02-19 VITALS
RESPIRATION RATE: 16 BRPM | SYSTOLIC BLOOD PRESSURE: 145 MMHG | BODY MASS INDEX: 22.34 KG/M2 | HEART RATE: 59 BPM | HEIGHT: 66 IN | OXYGEN SATURATION: 96 % | WEIGHT: 139 LBS | DIASTOLIC BLOOD PRESSURE: 82 MMHG

## 2020-02-19 DIAGNOSIS — I10 ESSENTIAL HYPERTENSION: Primary | ICD-10-CM

## 2020-02-19 DIAGNOSIS — N18.30 CKD (CHRONIC KIDNEY DISEASE) STAGE 3, GFR 30-59 ML/MIN (HCC): ICD-10-CM

## 2020-02-19 LAB
ANION GAP SERPL CALCULATED.3IONS-SCNC: 15 MMOL/L (ref 5–15)
BUN BLD-MCNC: 29 MG/DL (ref 8–23)
BUN/CREAT SERPL: 21.3 (ref 7–25)
CALCIUM SPEC-SCNC: 10 MG/DL (ref 8.6–10.5)
CHLORIDE SERPL-SCNC: 92 MMOL/L (ref 98–107)
CO2 SERPL-SCNC: 25 MMOL/L (ref 22–29)
CREAT BLD-MCNC: 1.36 MG/DL (ref 0.76–1.27)
DEPRECATED RDW RBC AUTO: 39.5 FL (ref 37–54)
ERYTHROCYTE [DISTWIDTH] IN BLOOD BY AUTOMATED COUNT: 11.9 % (ref 12.3–15.4)
GFR SERPL CREATININE-BSD FRML MDRD: 53 ML/MIN/1.73
GLUCOSE BLD-MCNC: 90 MG/DL (ref 65–99)
HCT VFR BLD AUTO: 45.9 % (ref 37.5–51)
HGB BLD-MCNC: 15.5 G/DL (ref 13–17.7)
MCH RBC QN AUTO: 30.7 PG (ref 26.6–33)
MCHC RBC AUTO-ENTMCNC: 33.8 G/DL (ref 31.5–35.7)
MCV RBC AUTO: 90.9 FL (ref 79–97)
PLATELET # BLD AUTO: 266 10*3/MM3 (ref 140–450)
PMV BLD AUTO: 9.6 FL (ref 6–12)
POTASSIUM BLD-SCNC: 4.1 MMOL/L (ref 3.5–5.2)
RBC # BLD AUTO: 5.05 10*6/MM3 (ref 4.14–5.8)
SODIUM BLD-SCNC: 132 MMOL/L (ref 136–145)
WBC NRBC COR # BLD: 6.91 10*3/MM3 (ref 3.4–10.8)

## 2020-02-19 PROCEDURE — 25010000002 MIDAZOLAM PER 1 MG: Performed by: INTERNAL MEDICINE

## 2020-02-19 PROCEDURE — 25010000002 PHENYLEPHRINE PER 1 ML: Performed by: INTERNAL MEDICINE

## 2020-02-19 PROCEDURE — 25010000002 ONDANSETRON PER 1 MG: Performed by: INTERNAL MEDICINE

## 2020-02-19 PROCEDURE — C1894 INTRO/SHEATH, NON-LASER: HCPCS | Performed by: INTERNAL MEDICINE

## 2020-02-19 PROCEDURE — 93880 EXTRACRANIAL BILAT STUDY: CPT

## 2020-02-19 PROCEDURE — C1769 GUIDE WIRE: HCPCS | Performed by: INTERNAL MEDICINE

## 2020-02-19 PROCEDURE — 82565 ASSAY OF CREATININE: CPT

## 2020-02-19 PROCEDURE — 25010000002 HEPARIN (PORCINE) PER 1000 UNITS: Performed by: INTERNAL MEDICINE

## 2020-02-19 PROCEDURE — 0 IOPAMIDOL PER 1 ML: Performed by: INTERNAL MEDICINE

## 2020-02-19 PROCEDURE — 36415 COLL VENOUS BLD VENIPUNCTURE: CPT

## 2020-02-19 PROCEDURE — 93306 TTE W/DOPPLER COMPLETE: CPT

## 2020-02-19 PROCEDURE — 99204 OFFICE O/P NEW MOD 45 MIN: CPT | Performed by: THORACIC SURGERY (CARDIOTHORACIC VASCULAR SURGERY)

## 2020-02-19 PROCEDURE — 85027 COMPLETE CBC AUTOMATED: CPT

## 2020-02-19 PROCEDURE — 25010000002 FENTANYL CITRATE (PF) 100 MCG/2ML SOLUTION: Performed by: INTERNAL MEDICINE

## 2020-02-19 PROCEDURE — 93458 L HRT ARTERY/VENTRICLE ANGIO: CPT | Performed by: INTERNAL MEDICINE

## 2020-02-19 PROCEDURE — 80048 BASIC METABOLIC PNL TOTAL CA: CPT

## 2020-02-19 RX ORDER — LORATADINE 10 MG/1
10 TABLET ORAL EVERY MORNING
COMMUNITY

## 2020-02-19 RX ORDER — LIDOCAINE HYDROCHLORIDE 10 MG/ML
INJECTION, SOLUTION EPIDURAL; INFILTRATION; INTRACAUDAL; PERINEURAL AS NEEDED
Status: DISCONTINUED | OUTPATIENT
Start: 2020-02-19 | End: 2020-02-19 | Stop reason: HOSPADM

## 2020-02-19 RX ORDER — AMOXICILLIN 250 MG
2 CAPSULE ORAL 2 TIMES DAILY
Status: DISCONTINUED | OUTPATIENT
Start: 2020-02-19 | End: 2020-02-19 | Stop reason: HOSPADM

## 2020-02-19 RX ORDER — ASPIRIN 325 MG
325 TABLET, DELAYED RELEASE (ENTERIC COATED) ORAL DAILY
Status: DISCONTINUED | OUTPATIENT
Start: 2020-02-19 | End: 2020-02-19 | Stop reason: HOSPADM

## 2020-02-19 RX ORDER — HYDROCODONE BITARTRATE AND ACETAMINOPHEN 5; 325 MG/1; MG/1
1 TABLET ORAL EVERY 4 HOURS PRN
Status: DISCONTINUED | OUTPATIENT
Start: 2020-02-19 | End: 2020-02-19 | Stop reason: HOSPADM

## 2020-02-19 RX ORDER — TEMAZEPAM 7.5 MG/1
7.5 CAPSULE ORAL NIGHTLY PRN
Status: DISCONTINUED | OUTPATIENT
Start: 2020-02-19 | End: 2020-02-19 | Stop reason: HOSPADM

## 2020-02-19 RX ORDER — SODIUM CHLORIDE 9 MG/ML
INJECTION, SOLUTION INTRAVENOUS CONTINUOUS PRN
Status: COMPLETED | OUTPATIENT
Start: 2020-02-19 | End: 2020-02-19

## 2020-02-19 RX ORDER — FENTANYL CITRATE 50 UG/ML
INJECTION, SOLUTION INTRAMUSCULAR; INTRAVENOUS AS NEEDED
Status: DISCONTINUED | OUTPATIENT
Start: 2020-02-19 | End: 2020-02-19 | Stop reason: HOSPADM

## 2020-02-19 RX ORDER — CHLORAL HYDRATE 500 MG
2000 CAPSULE ORAL
COMMUNITY

## 2020-02-19 RX ORDER — MORPHINE SULFATE 2 MG/ML
1 INJECTION, SOLUTION INTRAMUSCULAR; INTRAVENOUS EVERY 4 HOURS PRN
Status: DISCONTINUED | OUTPATIENT
Start: 2020-02-19 | End: 2020-02-19 | Stop reason: HOSPADM

## 2020-02-19 RX ORDER — SODIUM CHLORIDE 9 MG/ML
250 INJECTION, SOLUTION INTRAVENOUS CONTINUOUS
Status: ACTIVE | OUTPATIENT
Start: 2020-02-19 | End: 2020-02-19

## 2020-02-19 RX ORDER — ACETAMINOPHEN 325 MG/1
650 TABLET ORAL EVERY 4 HOURS PRN
Status: DISCONTINUED | OUTPATIENT
Start: 2020-02-19 | End: 2020-02-19 | Stop reason: HOSPADM

## 2020-02-19 RX ORDER — ALPRAZOLAM 0.25 MG/1
0.25 TABLET ORAL 3 TIMES DAILY PRN
Status: DISCONTINUED | OUTPATIENT
Start: 2020-02-19 | End: 2020-02-19 | Stop reason: HOSPADM

## 2020-02-19 RX ORDER — ASPIRIN 81 MG/1
81 TABLET ORAL DAILY
COMMUNITY

## 2020-02-19 RX ORDER — ONDANSETRON 2 MG/ML
INJECTION INTRAMUSCULAR; INTRAVENOUS AS NEEDED
Status: DISCONTINUED | OUTPATIENT
Start: 2020-02-19 | End: 2020-02-19 | Stop reason: HOSPADM

## 2020-02-19 RX ORDER — BISACODYL 5 MG/1
5 TABLET, DELAYED RELEASE ORAL DAILY PRN
Status: DISCONTINUED | OUTPATIENT
Start: 2020-02-19 | End: 2020-02-19 | Stop reason: HOSPADM

## 2020-02-19 RX ORDER — NALOXONE HCL 0.4 MG/ML
0.4 VIAL (ML) INJECTION
Status: DISCONTINUED | OUTPATIENT
Start: 2020-02-19 | End: 2020-02-19 | Stop reason: HOSPADM

## 2020-02-19 RX ORDER — AMLODIPINE BESYLATE 10 MG/1
5 TABLET ORAL DAILY
COMMUNITY

## 2020-02-19 RX ORDER — SUCRALFATE ORAL 1 G/10ML
1 SUSPENSION ORAL 4 TIMES DAILY
COMMUNITY

## 2020-02-19 RX ORDER — AMOXICILLIN 250 MG
1 CAPSULE ORAL DAILY
COMMUNITY

## 2020-02-19 RX ORDER — MIDAZOLAM HYDROCHLORIDE 1 MG/ML
INJECTION INTRAMUSCULAR; INTRAVENOUS AS NEEDED
Status: DISCONTINUED | OUTPATIENT
Start: 2020-02-19 | End: 2020-02-19 | Stop reason: HOSPADM

## 2020-02-19 RX ORDER — BISACODYL 10 MG
10 SUPPOSITORY, RECTAL RECTAL DAILY PRN
Status: DISCONTINUED | OUTPATIENT
Start: 2020-02-19 | End: 2020-02-19 | Stop reason: HOSPADM

## 2020-02-19 RX ADMIN — ASPIRIN 325 MG: 325 TABLET, COATED ORAL at 12:40

## 2020-02-19 NOTE — H&P
Pre-Cardiac Catheterization Report  Cardiovascular Laboratory  [facility]      Patient:  Jaswant Gonzales  :  1955  PCP:  Cherelle Alberto MD  PHONE:  [phone]    DATE: 2020    BRIEF HPI:  Jaswant Gonzales is a 64 y.o. male with hypertension, hypercholesterolemia, and a family history of coronary artery disease.  He is complaining of a 6-month history of increasing shortness of breath and dyspnea on exertion.  He states it is moderate in severity lasting minutes.  His symptoms increased with walking and are decreased with rest.  He now presents for left heart catheterization with possible intervention.    Cardiac Risk Factors:  advanced age (older than 55 for men, 65 for women), dyslipidemia, family history of premature cardiovascular disease, hypertension, male gender, smoking/ tobacco exposure    Anginal class in last 2 weeks:  CCS class I    CHF Class in last 2 weeks:  NYHA Class II    Cardiogenic shock:  no    Cardiac arrest <24 hours:  no    Stress test within last 6 months:   yes   Details:    Previous cardiac catheterization:  no  Details:     Previous CABG:  no  Details:      Allergies:     IV contrast allergy:  no  No Known Allergies    MEDICATIONS:  Prior to Admission medications    Medication Sig Start Date End Date Taking? Authorizing Provider   aMILoride (MIDAMOR) 5 MG tablet Take 5 mg by mouth Daily.    Malgorzata Wagner MD   amLODIPine (NORVASC) 2.5 MG tablet Take 1 tablet by mouth Daily. 19   Leroy Lucas MD   lansoprazole (PREVACID) 30 MG capsule Take 30 mg by mouth 2 (Two) Times a Day.    Malgorzata Wagner MD   LORazepam (ATIVAN) 1 MG tablet Take 1 mg by mouth 3 (Three) Times a Day As Needed for Anxiety.    Malgorzata Wagner MD   magnesium oxide (MAGOX) 400 (241.3 Mg) MG tablet tablet Take 400 mg by mouth Daily.    Malgorzata Wagner MD   Multiple Vitamins-Minerals (MULTIVITAMIN MEN 50+) tablet Take 1 tablet by mouth Daily. Centrum    Bernard  MD Malgorzata   potassium chloride (K-DUR) 10 MEQ CR tablet Take 10 mEq by mouth 4 (Four) Times a Day. Take 5 tablets by mouth 4 times daily as directed    ProviderMalgorzata MD   spironolactone (ALDACTONE) 50 MG tablet Take 50 mg by mouth 4 (Four) Times a Day.    ProviderMalgorzata MD   aspirin 81 MG chewable tablet Chew 1 tablet Daily. 6/8/19 2/19/20  Leroy Lucas MD   atorvastatin (LIPITOR) 80 MG tablet Take 0.5 tablets by mouth Every Night. 6/8/19 2/19/20  Leroy Lucas MD   sucralfate (CARAFATE) 1 g tablet Take 1 g by mouth 3 (Three) Times a Day.  2/19/20  ProviderMalgorzata MD       Past medical & surgical history, social and family history reviewed in the electronic medical record.    ROS:  Cardiovascular ROS: positive for - dyspnea on exertion and shortness of breath    Physical Exam:    Vitals: There were no vitals filed for this visit. There were no vitals filed for this visit.    General Appearance:    Alert, cooperative, in no acute distress   Head:    Normocephalic, without obvious abnormality, atraumatic   Eyes:            Lids and lashes normal, conjunctivae and sclerae normal, no   icterus, no pallor, corneas clear, PERRLA   Ears:    Ears appear intact with no abnormalities noted   Neck:   No adenopathy, supple, trachea midline, no thyromegaly, no   carotid bruit, no JVD   Back:     No kyphosis present, no scoliosis present, range of motion normal   Lungs:     Clear to auscultation,respirations regular, even and                  unlabored    Heart:    Regular rhythm and normal rate, normal S1 and S2, no            murmur, no gallop, no rub, no click   Chest Wall:    No abnormalities observed   Abdomen:     Normal bowel sounds, no masses, no organomegaly, soft        non-tender, non-distended, no guarding, no rebound                tenderness   Rectal:     Deferred   Extremities:   Moves all extremities well, no edema, no cyanosis, no             redness   Pulses:    Pulses palpable and equal bilaterally   Skin:   No bleeding, bruising or rash   Neurologic:   Cranial nerves 2 - 12 grossly intact, sensation intact     Barbaeu Test:  Left: Normal  (oxymetric Allens) Right: Not Assessed             Lab Results   Component Value Date    TRIG 159 (H) 06/08/2019    HDL 37 (L) 06/08/2019           Impression      · Unstable angina    Plan     · Procedure to perform: [facility]  · Planned access: Left radial artery              ILENE Causey  02/19/20  11:39 AM

## 2020-02-19 NOTE — CONSULTS
CTS Consult    Patient Care Team:  Cherelle Alberto MD as PCP - General (Family Medicine)      Reason for Consult: Severe multivessel coronary artery disease    HPI  The patient is a 64-year-old active male with a history of stage III chronic kidney disease, hypertension and a transient ischemic attack in July of 2019 who presented with a 6-month history of worsening dyspnea on exertion with associated fatigue however denied ever experiencing any chest pain.  The patient states he was initially evaluated by a provider in [place] however was subsequently evaluated by Dr. Lehman at which time he underwent a stress test which was abnormal.  The patient subsequently underwent a left heart catheterization on 2/19/2020 which revealed a 30% ostial left main stenosis with three-vessel disease.  Currently the patient denies any chest pain or dyspnea.      Review of Systems    A comprehensive review of systems was negative except for:   Constitutional: Admits to fatigue however denies any recent fever chills or recent weight loss or weight gain  Respiratory: Denies recent cough, pneumonia bronchitis or history of chronic obstructive pulmonary disease  Cardiovascular: Admits to dyspnea on exertion however denies any chest pain, palpitations, pedal edema or history of dysrhythmia  Gastrointestinal: Admits to history of hematochezia secondary to what sounds like a perforated gastric ulcer requiring partial gastrectomy and colectomy in the 1980s, there is been no recurrence and denies any hematemesis, melena or recurrent history of peptic ulcer disease  Hematologic/lymphatic: Denies history of coagulopathy, deep vein thrombosis or pulmonary embolus  Musculoskeletal: Admits to infrequent myalgias but denies arthralgias  Neurological: Admits to history of transischemic tach with complete right hemiparesis with complete resolution and no sequela in July 2019, denies any history of headaches, cerebrovascular accident or  "seizure disorder      History  Past Medical History:   Diagnosis Date   • GERD (gastroesophageal reflux disease)     \"Barter's syndrome\"   • Hyperlipidemia    • Hypertension    • Tobacco abuse      Past Surgical History:   Procedure Laterality Date   • OTHER SURGICAL HISTORY  1992    70% of stomach removed + colon patched R/T prolonged use of Indocin     History reviewed. No pertinent family history.  Social History     Tobacco Use   • Smoking status: Former Smoker   • Smokeless tobacco: Current User   • Tobacco comment: 2/19/20--QUIT 35 YRS AGO; 1/2 PACK CHEW/DAY   Substance Use Topics   • Alcohol use: No     Frequency: Never   • Drug use: Defer     Medications Prior to Admission   Medication Sig Dispense Refill Last Dose   • aMILoride (MIDAMOR) 5 MG tablet Take 5 mg by mouth Every Night.   2/18/2020 at Unknown time   • amLODIPine (NORVASC) 10 MG tablet Take 5 mg by mouth Daily.   2/18/2020 at Unknown time   • aspirin 81 MG EC tablet Take 81 mg by mouth Daily.   2/18/2020 at Unknown time   • lansoprazole (PREVACID) 30 MG capsule Take 30 mg by mouth Daily.   2/18/2020 at Unknown time   • loratadine (CLARITIN) 10 MG tablet Take 10 mg by mouth Every Morning.   2/18/2020 at Unknown time   • LORazepam (ATIVAN) 1 MG tablet Take 1 mg by mouth 3 (Three) Times a Day As Needed for Anxiety. STATES USUALLY TAKES 2 TABS QHS   2/18/2020 at Unknown time   • magnesium oxide (MAGOX) 400 (241.3 Mg) MG tablet tablet Take 400 mg by mouth Daily.   2/18/2020 at Unknown time   • Multiple Vitamins-Minerals (MULTIVITAMIN MEN 50+) tablet Take 1 tablet by mouth Daily. Centrum   2/18/2020 at Unknown time   • Omega-3 Fatty Acids (FISH OIL) 1000 MG capsule capsule Take 2,000 mg by mouth Daily With Breakfast.   2/18/2020 at Unknown time   • potassium chloride (K-DUR) 10 MEQ CR tablet Take 50 mEq by mouth 4 (Four) Times a Day. Take 5 tablets by mouth 4 times daily as directed    2/18/2020 at Unknown time   • sennosides-docusate (STOOL " SOFTENER/LAXATIVE) 8.6-50 MG per tablet Take 1 tablet by mouth Daily.   2/18/2020 at Unknown time   • spironolactone (ALDACTONE) 50 MG tablet Take 50 mg by mouth 4 (Four) Times a Day.   2/18/2020 at Unknown time   • sucralfate (CARAFATE) 1 GM/10ML suspension Take 1 g by mouth 4 (Four) Times a Day.   2/18/2020 at Unknown time     Allergies:  Patient has no known allergies.    Objective    Vital Signs  Heart Rate:  [57-77] 77  Resp:  [16] 16  BP: (104-151)/(66-93) 112/77    Physical Exam:  General Appearance: Well-developed, well-nourished no acute distress  HEENT: Normocephalic, atraumatic, PERRLA, EOMs intact with mucous membranes moist no scleral icterus  Neck: Supple without bruit  Lungs: Clear to auscultation bilaterally with no wheezes, rales or rhonchi  Heart: Regular rate and rhythm no murmurs, rubs or gallops.  Normal S1-S2.  Abdomen: Soft, nontender/nondistended with normoactive bowel sounds no rebound or guarding and no organomegaly appreciated  Extremities: Warm good color well-perfused no bilateral lower extremity edema  Pulses: 2+ carotid pulses bilaterally, 2+ radial pulses bilaterally, 2+ femoral pulses bilaterally, 2+ dorsalis pedis posterior tibialis pulses bilaterally  Skin: No clubbing cyanosis no lesions or rashes appreciated  Neurologic: Alert and oriented x3 with cranial nerves II through XII grossly intact with no motor or sensory deficits appreciated          Data Review:  Results from last 7 days   Lab Units 02/19/20  1116   WBC 10*3/mm3 6.91   HEMOGLOBIN g/dL 15.5   HEMATOCRIT % 45.9   PLATELETS 10*3/mm3 266     Results from last 7 days   Lab Units 02/19/20  1116   SODIUM mmol/L 132*   POTASSIUM mmol/L 4.1   CHLORIDE mmol/L 92*   CO2 mmol/L 25.0   BUN mg/dL 29*   CREATININE mg/dL 1.36*   GLUCOSE mg/dL 90   CALCIUM mg/dL 10.0       Assessment:        * No active hospital problems. *  Severe multivessel coronary disease with left main coronary stenosis  Stage III chronic kidney  disease  Transient ischemic attack    Plan:  The patient would benefit from elective surgical revascularization.  The patient will need to undergo routine preoperative testing in addition to pulmonary function testing, bilateral carotid artery duplex to assess his carotid arteries and his echocardiogram report will need to be obtained from Dr. Lehman's office.  The patient is being discharged home by cardiology and will follow-up with our office for scheduling of pre-admission testing and scheduling of his coronary artery bypass grafting with Dr. Fabian Richard.      ILENE Armstrong  02/19/20  4:45 PM

## 2020-02-20 ENCOUNTER — DOCUMENTATION (OUTPATIENT)
Dept: CARDIAC REHAB | Facility: HOSPITAL | Age: 65
End: 2020-02-20

## 2020-02-20 LAB
BH CV ECHO MEAS - AO MAX PG (FULL): 1.7 MMHG
BH CV ECHO MEAS - AO MAX PG: 4.6 MMHG
BH CV ECHO MEAS - AO ROOT AREA (BSA CORRECTED): 1.9
BH CV ECHO MEAS - AO ROOT AREA: 8.1 CM^2
BH CV ECHO MEAS - AO ROOT DIAM: 3.2 CM
BH CV ECHO MEAS - AO V2 MAX: 106.9 CM/SEC
BH CV ECHO MEAS - AVA(V,A): 2.9 CM^2
BH CV ECHO MEAS - AVA(V,D): 2.9 CM^2
BH CV ECHO MEAS - BSA(HAYCOCK): 1.7 M^2
BH CV ECHO MEAS - BSA(HAYCOCK): 1.7 M^2
BH CV ECHO MEAS - BSA: 1.7 M^2
BH CV ECHO MEAS - BSA: 1.7 M^2
BH CV ECHO MEAS - BZI_BMI: 22.4 KILOGRAMS/M^2
BH CV ECHO MEAS - BZI_BMI: 22.4 KILOGRAMS/M^2
BH CV ECHO MEAS - BZI_METRIC_HEIGHT: 167.6 CM
BH CV ECHO MEAS - BZI_METRIC_HEIGHT: 167.6 CM
BH CV ECHO MEAS - BZI_METRIC_WEIGHT: 63.1 KG
BH CV ECHO MEAS - BZI_METRIC_WEIGHT: 63.1 KG
BH CV ECHO MEAS - EDV(CUBED): 140.4 ML
BH CV ECHO MEAS - EDV(MOD-SP2): 56 ML
BH CV ECHO MEAS - EDV(MOD-SP4): 67 ML
BH CV ECHO MEAS - EDV(TEICH): 129.4 ML
BH CV ECHO MEAS - EF(CUBED): 77 %
BH CV ECHO MEAS - EF(MOD-SP2): 41.1 %
BH CV ECHO MEAS - EF(MOD-SP4): 50.7 %
BH CV ECHO MEAS - EF(TEICH): 68.7 %
BH CV ECHO MEAS - ESV(CUBED): 32.3 ML
BH CV ECHO MEAS - ESV(MOD-SP2): 33 ML
BH CV ECHO MEAS - ESV(MOD-SP4): 33 ML
BH CV ECHO MEAS - ESV(TEICH): 40.5 ML
BH CV ECHO MEAS - FS: 38.7 %
BH CV ECHO MEAS - IVS/LVPW: 1.1
BH CV ECHO MEAS - IVSD: 0.88 CM
BH CV ECHO MEAS - LA DIMENSION: 2.7 CM
BH CV ECHO MEAS - LA/AO: 0.84
BH CV ECHO MEAS - LAD MAJOR: 4.9 CM
BH CV ECHO MEAS - LAT PEAK E' VEL: 7.5 CM/SEC
BH CV ECHO MEAS - LATERAL E/E' RATIO: 6.4
BH CV ECHO MEAS - LV DIASTOLIC VOL/BSA (35-75): 39.1 ML/M^2
BH CV ECHO MEAS - LV MASS(C)D: 155 GRAMS
BH CV ECHO MEAS - LV MASS(C)DI: 90.5 GRAMS/M^2
BH CV ECHO MEAS - LV MAX PG: 2.9 MMHG
BH CV ECHO MEAS - LV MEAN PG: 1.5 MMHG
BH CV ECHO MEAS - LV SYSTOLIC VOL/BSA (12-30): 19.3 ML/M^2
BH CV ECHO MEAS - LV V1 MAX: 84.4 CM/SEC
BH CV ECHO MEAS - LV V1 MEAN: 57.1 CM/SEC
BH CV ECHO MEAS - LV V1 VTI: 17.1 CM
BH CV ECHO MEAS - LVIDD: 5.2 CM
BH CV ECHO MEAS - LVIDS: 3.2 CM
BH CV ECHO MEAS - LVLD AP2: 7 CM
BH CV ECHO MEAS - LVLD AP4: 7 CM
BH CV ECHO MEAS - LVLS AP2: 6.7 CM
BH CV ECHO MEAS - LVLS AP4: 6.5 CM
BH CV ECHO MEAS - LVOT AREA (M): 3.8 CM^2
BH CV ECHO MEAS - LVOT AREA: 3.6 CM^2
BH CV ECHO MEAS - LVOT DIAM: 2.2 CM
BH CV ECHO MEAS - LVPWD: 0.8 CM
BH CV ECHO MEAS - MED PEAK E' VEL: 4.3 CM/SEC
BH CV ECHO MEAS - MEDIAL E/E' RATIO: 11
BH CV ECHO MEAS - MV A MAX VEL: 67.1 CM/SEC
BH CV ECHO MEAS - MV DEC TIME: 0.31 SEC
BH CV ECHO MEAS - MV E MAX VEL: 48.6 CM/SEC
BH CV ECHO MEAS - MV E/A: 0.72
BH CV ECHO MEAS - PA ACC SLOPE: 583.9 CM/SEC^2
BH CV ECHO MEAS - PA ACC TIME: 0.1 SEC
BH CV ECHO MEAS - PA MAX PG: 2.5 MMHG
BH CV ECHO MEAS - PA PR(ACCEL): 33.8 MMHG
BH CV ECHO MEAS - PA V2 MAX: 78.7 CM/SEC
BH CV ECHO MEAS - PI END-D VEL: 110.6 CM/SEC
BH CV ECHO MEAS - SI(CUBED): 63.1 ML/M^2
BH CV ECHO MEAS - SI(LVOT): 36.3 ML/M^2
BH CV ECHO MEAS - SI(MOD-SP2): 13.4 ML/M^2
BH CV ECHO MEAS - SI(MOD-SP4): 19.8 ML/M^2
BH CV ECHO MEAS - SI(TEICH): 51.9 ML/M^2
BH CV ECHO MEAS - SV(CUBED): 108.1 ML
BH CV ECHO MEAS - SV(LVOT): 62.2 ML
BH CV ECHO MEAS - SV(MOD-SP2): 23 ML
BH CV ECHO MEAS - SV(MOD-SP4): 34 ML
BH CV ECHO MEAS - SV(TEICH): 88.9 ML
BH CV ECHO MEAS - TAPSE (>1.6): 1.8 CM2
BH CV ECHO MEASUREMENTS AVERAGE E/E' RATIO: 8.24
BH CV VAS BP RIGHT ARM: NORMAL MMHG
BH CV XLRA - RV BASE: 3.6 CM
BH CV XLRA - RV LENGTH: 7.4 CM
BH CV XLRA - RV MID: 2.8 CM
BH CV XLRA - TDI S': 13.5 CM/SEC
BH CV XLRA MEAS LEFT CCA RATIO VEL: 53.7 CM/SEC
BH CV XLRA MEAS LEFT DIST CCA EDV: 18.9 CM/SEC
BH CV XLRA MEAS LEFT DIST CCA PSV: 54.1 CM/SEC
BH CV XLRA MEAS LEFT DIST ICA EDV: 20.1 CM/SEC
BH CV XLRA MEAS LEFT DIST ICA PSV: 51.2 CM/SEC
BH CV XLRA MEAS LEFT ICA RATIO VEL: 54.7 CM/SEC
BH CV XLRA MEAS LEFT ICA/CCA RATIO: 1
BH CV XLRA MEAS LEFT MID CCA EDV: 18.9 CM/SEC
BH CV XLRA MEAS LEFT MID CCA PSV: 91.1 CM/SEC
BH CV XLRA MEAS LEFT MID ICA EDV: 18.2 CM/SEC
BH CV XLRA MEAS LEFT MID ICA PSV: 50.3 CM/SEC
BH CV XLRA MEAS LEFT PROX CCA EDV: 21.4 CM/SEC
BH CV XLRA MEAS LEFT PROX CCA PSV: 105.6 CM/SEC
BH CV XLRA MEAS LEFT PROX ECA EDV: 9 CM/SEC
BH CV XLRA MEAS LEFT PROX ECA PSV: 62.9 CM/SEC
BH CV XLRA MEAS LEFT PROX ICA EDV: 13.2 CM/SEC
BH CV XLRA MEAS LEFT PROX ICA PSV: 55 CM/SEC
BH CV XLRA MEAS LEFT PROX SCLA PSV: 110 CM/SEC
BH CV XLRA MEAS LEFT VERTEBRAL A EDV: 11.8 CM/SEC
BH CV XLRA MEAS LEFT VERTEBRAL A PSV: 37.2 CM/SEC
BH CV XLRA MEAS RIGHT CCA RATIO VEL: 49.9 CM/SEC
BH CV XLRA MEAS RIGHT DIST CCA EDV: 12.9 CM/SEC
BH CV XLRA MEAS RIGHT DIST CCA PSV: 50.3 CM/SEC
BH CV XLRA MEAS RIGHT DIST ICA EDV: 21.4 CM/SEC
BH CV XLRA MEAS RIGHT DIST ICA PSV: 57.6 CM/SEC
BH CV XLRA MEAS RIGHT ICA RATIO VEL: 79 CM/SEC
BH CV XLRA MEAS RIGHT ICA/CCA RATIO: 1.6
BH CV XLRA MEAS RIGHT MID CCA EDV: 19.9 CM/SEC
BH CV XLRA MEAS RIGHT MID CCA PSV: 77.2 CM/SEC
BH CV XLRA MEAS RIGHT MID ICA EDV: 20.5 CM/SEC
BH CV XLRA MEAS RIGHT MID ICA PSV: 79.5 CM/SEC
BH CV XLRA MEAS RIGHT PROX CCA EDV: 23.7 CM/SEC
BH CV XLRA MEAS RIGHT PROX CCA PSV: 97.8 CM/SEC
BH CV XLRA MEAS RIGHT PROX ECA PSV: 84.4 CM/SEC
BH CV XLRA MEAS RIGHT PROX ICA EDV: 20.5 CM/SEC
BH CV XLRA MEAS RIGHT PROX ICA PSV: 70.7 CM/SEC
BH CV XLRA MEAS RIGHT PROX SCLA PSV: 223.8 CM/SEC
BH CV XLRA MEAS RIGHT VERTEBRAL A EDV: 10 CM/SEC
BH CV XLRA MEAS RIGHT VERTEBRAL A PSV: 29.3 CM/SEC
LEFT ARM BP: NORMAL MMHG
LEFT ATRIUM VOLUME INDEX: 18.7 ML/M^2
LEFT ATRIUM VOLUME: 32 ML
MAXIMAL PREDICTED HEART RATE: 155 BPM
STRESS TARGET HR: 132 BPM

## 2020-02-21 ENCOUNTER — TELEPHONE (OUTPATIENT)
Dept: CARDIAC SURGERY | Facility: CLINIC | Age: 65
End: 2020-02-21

## 2020-02-21 NOTE — TELEPHONE ENCOUNTER
I called Mr. Gonzales today to see if he would like to go over a Surgery date for his CABG W/ Dr. Richard. Mr. Gonzales stated he does not wish to proceed with surgery. I let him know that if any thing changes to please contact our office. I will inform Dr. Richard of this information as well.

## 2020-02-27 NOTE — TELEPHONE ENCOUNTER
I spoke with Dr. Lehman.  He is going to call him and try to figure out what is going on.  We'll see what patient decides.

## 2020-02-28 LAB — CREAT BLDA-MCNC: 1.5 MG/DL (ref 0.6–1.3)

## 2024-03-13 DIAGNOSIS — I20.0 UNSTABLE ANGINA: Primary | ICD-10-CM

## 2025-03-26 NOTE — PROGRESS NOTES
New Cardiology Patient Office Visit      Date: 2025  Patient Name: Jaswant Gonzales  : 1955   MRN: [id number]   PCP: Cherelle Alberto MD   Referring Provider: Referring, Self     Chief Complaint:    Chief Complaint   Patient presents with    Establish Care    Coronary Artery Disease       History of Present Illness: Jaswant Gonzales is a 69 y.o. male who is here today for follow-up on multiple medical problems.    Patient has a history of coronary artery disease and underwent coronary artery bypass grafting in .  Patient has been having shortness of breath since then sleep did not help him a whole lot.  Patient also has renal disease and is in stage IV of renal failure.    Patient also has been having a little confusion and imbalance gait.  He has a history of stroke in about .  He is able to do some activities but he become very short of breath.    Patient denies any weight loss or any weight gain.  Patient denies any lower extremity edema.  Patient denies any bleeding at this time.        Problem List   CARDIAC  Coronary Artery Disease:   C, 2020: Multivessel disease  CABG 3/20/2020: LIMA-LAD, SVG to OM1 OM 2, SVG to diagonal, SVG to PDA    Myocardium:   Echo, 2024: LVEF 40% to 45%     Valvular:   Mild sclerosis     Electrical:   NSR     Pericardium:   Normal     VASCULAR  Arterial  Cerebrovascular disease:   TIA,      CARDIAC RISK FACTORS  Hypertension  Dyslipidemia  Tobacco Use: Former Smoker    NON-CARDIAC  CKD stage IV  GERD      SURGERIES  Coronary artery bypass graft  Colon Surgery    Subjective      Review of Systems:   Review of Systems   Respiratory:  Positive for shortness of breath.        Medications:   Current Outpatient Medications   Medication Sig Dispense Refill    aMILoride (MIDAMOR) 5 MG tablet Take 1 tablet by mouth Every Night.      amLODIPine (NORVASC) 10 MG tablet Take 0.5 tablets by mouth Daily.      aspirin 81 MG EC tablet Take 1 tablet by mouth Daily.    "   DULoxetine (CYMBALTA) 30 MG capsule Take 1 capsule by mouth Daily.      Ferrous Sulfate ER (Slow Fe) 45 MG tablet controlled-release Take 1 tablet by mouth Daily.      finasteride (PROSCAR) 5 MG tablet Take 1 tablet by mouth Every Morning.      Leqvio 284 MG/1.5ML solution prefilled syringe TAKE TO PRESCRIBER'S OFFICE TO HAVE INJECTED INJECT 284MG (CONTENTS OF 1 SYRINGE) SUBCUTANEOUSLY ONCE EVERY SIX MONTHS.      LORazepam (ATIVAN) 1 MG tablet Take 2 tablets by mouth every night at bedtime. STATES USUALLY TAKES 2 TABS QHS      magnesium oxide (MAGOX) 400 (241.3 Mg) MG tablet tablet Take 1 tablet by mouth Daily.      Multiple Vitamins-Minerals (MULTIVITAMIN MEN 50+) tablet Take 1 tablet by mouth Daily. Centrum      potassium chloride (K-DUR) 10 MEQ CR tablet Take 5 tablets by mouth 4 (Four) Times a Day. Take 5 tablets by mouth 4 times daily as directed      pregabalin (LYRICA) 75 MG capsule Take 1 capsule by mouth 2 (Two) Times a Day.      sennosides-docusate (STOOL SOFTENER/LAXATIVE) 8.6-50 MG per tablet Take 1 tablet by mouth Daily.      spironolactone (ALDACTONE) 50 MG tablet Take 1 tablet by mouth 4 (Four) Times a Day.      sucralfate (CARAFATE) 1 GM/10ML suspension Take 10 mL by mouth 4 (Four) Times a Day.      tamsulosin (FLOMAX) 0.4 MG capsule 24 hr capsule Take 1 capsule by mouth 2 (Two) Times a Day.       No current facility-administered medications for this visit.           The following portions of the patient's history were reviewed and updated as appropriate: allergies, current medications, past family history, past medical history, past social history, past surgical history and problem list.    Objective   Vital Signs:   Vitals:    03/27/25 1054   BP: 126/70   BP Location: Right arm   Patient Position: Sitting   Cuff Size: Adult   Pulse: 67   SpO2: 97%   Weight: 76.2 kg (168 lb)   Height: 167.6 cm (66\")     Body mass index is 27.12 kg/m².     Physical Exam:  Constitutional:       General: Not in " acute distress.     Appearance: Healthy appearance. Not in distress.     Neck:     JVP:Not elevated     Carotid artery: Normal    Pulmonary:      Effort: Pulmonary effort is normal.      Breath sounds: Normal breath sounds. No wheezing. No rhonchi. No rales.     Cardiovascular:      Normal rate. Regular rhythm. Normal S1. Normal S2.      Murmurs: There is 2/6 systolic murmur.      No gallop. No click. No rub.     Abdominal:      General: Bowel sounds are normal.      Palpations: Abdomen is soft.      Tenderness: There is no abdominal tenderness.    Extremities:     Pulses:Normal radial and pedal pulses     Edema:no edema      Labs:        ECG 12 Lead    Date/Time: 3/27/2025 12:47 PM  Performed by: Dc Rodriguez MD    Authorized by: Dc Rodriguez MD  Previous ECG: no previous ECG available  Rhythm: sinus rhythm  Q waves: V1, V2 and V3    Other findings: non-specific ST-T wave changes    Clinical impression: abnormal EKG          Smoking Cessation:   Tobacco Product History : Patient quit smoking long time ago     Advance Care Planning   ACP discussion was declined by the patient. Patient does not have an advance directive, declines further assistance.            Assessment / Plan      Assessment:   Diagnosis Plan   1. Stage 3b chronic kidney disease        2. TIA (transient ischemic attack)        3. Essential hypertension        4. Coronary artery disease involving native coronary artery of native heart without angina pectoris             Plan:  Patient has multiple medical problems going on.  I believe his shortness of breath might be related to his kidney problem which is getting worse.  We will get his kidney function done today and we will send him to a nephrologist.  He has never had any workup or evaluation after his stroke in 2019.  He will see Dr. Farrar in [place].  His blood pressure has been under good control and we will continue with the current medications.  His EF has been around 35 to 40%.   Will go ahead and schedule him for a stress test for further evaluation.            Follow Up:   Return in about 3 months (around 6/27/2025).    Dc Rodriguez MD

## 2025-03-27 ENCOUNTER — OFFICE VISIT (OUTPATIENT)
Dept: CARDIOLOGY | Facility: CLINIC | Age: 70
End: 2025-03-27
Payer: MEDICARE

## 2025-03-27 ENCOUNTER — LAB (OUTPATIENT)
Dept: LAB | Facility: HOSPITAL | Age: 70
End: 2025-03-27
Payer: MEDICARE

## 2025-03-27 VITALS
DIASTOLIC BLOOD PRESSURE: 70 MMHG | SYSTOLIC BLOOD PRESSURE: 126 MMHG | HEART RATE: 67 BPM | HEIGHT: 66 IN | BODY MASS INDEX: 27 KG/M2 | WEIGHT: 168 LBS | OXYGEN SATURATION: 97 %

## 2025-03-27 DIAGNOSIS — N18.32 STAGE 3B CHRONIC KIDNEY DISEASE: Primary | ICD-10-CM

## 2025-03-27 DIAGNOSIS — R94.31 ABNORMAL ELECTROCARDIOGRAM (ECG) (EKG): ICD-10-CM

## 2025-03-27 DIAGNOSIS — E78.2 MIXED HYPERLIPIDEMIA: ICD-10-CM

## 2025-03-27 DIAGNOSIS — I25.10 CORONARY ARTERY DISEASE INVOLVING NATIVE CORONARY ARTERY OF NATIVE HEART WITHOUT ANGINA PECTORIS: ICD-10-CM

## 2025-03-27 DIAGNOSIS — I10 ESSENTIAL HYPERTENSION: ICD-10-CM

## 2025-03-27 DIAGNOSIS — I13.0 HYPERTENSIVE HEART AND CHRONIC KIDNEY DISEASE WITH HEART FAILURE AND STAGE 1 THROUGH STAGE 4 CHRONIC KIDNEY DISEASE, OR UNSPECIFIED CHRONIC KIDNEY DISEASE: ICD-10-CM

## 2025-03-27 DIAGNOSIS — G45.9 TIA (TRANSIENT ISCHEMIC ATTACK): ICD-10-CM

## 2025-03-27 DIAGNOSIS — N18.32 STAGE 3B CHRONIC KIDNEY DISEASE: ICD-10-CM

## 2025-03-27 LAB
ALBUMIN SERPL-MCNC: 4.3 G/DL (ref 3.5–5.2)
ALBUMIN UR-MCNC: 89.3 MG/DL
ALBUMIN/GLOB SERPL: 1.4 G/DL
ALP SERPL-CCNC: 113 U/L (ref 39–117)
ALT SERPL W P-5'-P-CCNC: 21 U/L (ref 1–41)
ANION GAP SERPL CALCULATED.3IONS-SCNC: 14.6 MMOL/L (ref 5–15)
AST SERPL-CCNC: 40 U/L (ref 1–40)
BILIRUB CONJ SERPL-MCNC: 0.1 MG/DL (ref 0–0.3)
BILIRUB SERPL-MCNC: 0.3 MG/DL (ref 0–1.2)
BUN SERPL-MCNC: 49 MG/DL (ref 8–23)
BUN/CREAT SERPL: 18.7 (ref 7–25)
CALCIUM SPEC-SCNC: 9.5 MG/DL (ref 8.6–10.5)
CHLORIDE SERPL-SCNC: 98 MMOL/L (ref 98–107)
CHOLEST SERPL-MCNC: 160 MG/DL (ref 0–200)
CO2 SERPL-SCNC: 25.4 MMOL/L (ref 22–29)
CREAT SERPL-MCNC: 2.62 MG/DL (ref 0.76–1.27)
CREAT UR-MCNC: 49.6 MG/DL
CRP SERPL-MCNC: 3.45 MG/DL (ref 0.01–0.5)
DEPRECATED RDW RBC AUTO: 45.5 FL (ref 37–54)
EGFRCR SERPLBLD CKD-EPI 2021: 25.5 ML/MIN/1.73
ERYTHROCYTE [DISTWIDTH] IN BLOOD BY AUTOMATED COUNT: 15.2 % (ref 12.3–15.4)
GLOBULIN UR ELPH-MCNC: 3 GM/DL
GLUCOSE SERPL-MCNC: 72 MG/DL (ref 65–99)
HCT VFR BLD AUTO: 43.3 % (ref 37.5–51)
HDLC SERPL-MCNC: 52 MG/DL (ref 40–60)
HGB BLD-MCNC: 13.9 G/DL (ref 13–17.7)
LDLC SERPL CALC-MCNC: 68 MG/DL (ref 0–100)
LDLC/HDLC SERPL: 1.11 {RATIO}
MCH RBC QN AUTO: 26.6 PG (ref 26.6–33)
MCHC RBC AUTO-ENTMCNC: 32.1 G/DL (ref 31.5–35.7)
MCV RBC AUTO: 82.8 FL (ref 79–97)
MICROALBUMIN/CREAT UR: 1800.4 MG/G (ref 0–29)
NT-PROBNP SERPL-MCNC: 530 PG/ML (ref 0–900)
PLATELET # BLD AUTO: 245 10*3/MM3 (ref 140–450)
PMV BLD AUTO: 11.4 FL (ref 6–12)
POTASSIUM SERPL-SCNC: 3.9 MMOL/L (ref 3.5–5.2)
PROT SERPL-MCNC: 7.3 G/DL (ref 6–8.5)
RBC # BLD AUTO: 5.23 10*6/MM3 (ref 4.14–5.8)
SODIUM SERPL-SCNC: 138 MMOL/L (ref 136–145)
TRIGL SERPL-MCNC: 252 MG/DL (ref 0–150)
VLDLC SERPL-MCNC: 40 MG/DL (ref 5–40)
WBC NRBC COR # BLD AUTO: 8.13 10*3/MM3 (ref 3.4–10.8)

## 2025-03-27 PROCEDURE — 85027 COMPLETE CBC AUTOMATED: CPT | Performed by: INTERNAL MEDICINE

## 2025-03-27 PROCEDURE — 82248 BILIRUBIN DIRECT: CPT

## 2025-03-27 PROCEDURE — 82570 ASSAY OF URINE CREATININE: CPT

## 2025-03-27 PROCEDURE — 80053 COMPREHEN METABOLIC PANEL: CPT

## 2025-03-27 PROCEDURE — 82043 UR ALBUMIN QUANTITATIVE: CPT

## 2025-03-27 PROCEDURE — 36415 COLL VENOUS BLD VENIPUNCTURE: CPT

## 2025-03-27 PROCEDURE — 83880 ASSAY OF NATRIURETIC PEPTIDE: CPT

## 2025-03-27 PROCEDURE — 80061 LIPID PANEL: CPT

## 2025-03-27 PROCEDURE — 86141 C-REACTIVE PROTEIN HS: CPT

## 2025-03-27 RX ORDER — DULOXETIN HYDROCHLORIDE 30 MG/1
30 CAPSULE, DELAYED RELEASE ORAL DAILY
COMMUNITY
Start: 2025-03-11

## 2025-03-27 RX ORDER — FINASTERIDE 5 MG/1
1 TABLET, FILM COATED ORAL EVERY MORNING
COMMUNITY
Start: 2024-12-17

## 2025-03-27 RX ORDER — TAMSULOSIN HYDROCHLORIDE 0.4 MG/1
1 CAPSULE ORAL 2 TIMES DAILY
COMMUNITY

## 2025-03-27 RX ORDER — INCLISIRAN 284 MG/1.5ML
INJECTION, SOLUTION SUBCUTANEOUS
COMMUNITY

## 2025-03-27 RX ORDER — PREGABALIN 75 MG/1
75 CAPSULE ORAL 2 TIMES DAILY
COMMUNITY
Start: 2024-11-04

## 2025-03-27 RX ORDER — UREA 10 %
1 LOTION (ML) TOPICAL DAILY
COMMUNITY

## 2025-03-28 LAB
CHOLEST SERPL-MCNC: 161 MG/DL (ref 100–199)
HDLC SERPL-MCNC: 50 MG/DL
LABORATORY COMMENT REPORT: NORMAL
NONHDLC SERPL-MCNC: 111 MG/DL (ref 0–129)

## 2025-04-04 ENCOUNTER — RESULTS FOLLOW-UP (OUTPATIENT)
Dept: CARDIOLOGY | Facility: CLINIC | Age: 70
End: 2025-04-04
Payer: MEDICARE

## 2025-04-10 ENCOUNTER — HOSPITAL ENCOUNTER (OUTPATIENT)
Dept: CARDIOLOGY | Facility: HOSPITAL | Age: 70
Discharge: HOME | End: 2025-04-10
Payer: MEDICARE

## 2025-04-10 VITALS — DIASTOLIC BLOOD PRESSURE: 78 MMHG | OXYGEN SATURATION: 97 % | HEART RATE: 56 BPM | SYSTOLIC BLOOD PRESSURE: 142 MMHG

## 2025-04-10 DIAGNOSIS — R94.31 ABNORMAL ELECTROCARDIOGRAM (ECG) (EKG): ICD-10-CM

## 2025-04-10 DIAGNOSIS — G45.9 TIA (TRANSIENT ISCHEMIC ATTACK): ICD-10-CM

## 2025-04-10 PROCEDURE — A9500 TC99M SESTAMIBI: HCPCS | Performed by: INTERNAL MEDICINE

## 2025-04-10 PROCEDURE — 78452 HT MUSCLE IMAGE SPECT MULT: CPT

## 2025-04-10 PROCEDURE — 93017 CV STRESS TEST TRACING ONLY: CPT

## 2025-04-10 PROCEDURE — 34310000005 TECHNETIUM SESTAMIBI: Performed by: INTERNAL MEDICINE

## 2025-04-10 RX ADMIN — TECHNETIUM TC 99M SESTAMIBI 1 DOSE: 1 INJECTION INTRAVENOUS at 08:20

## 2025-04-10 RX ADMIN — TECHNETIUM TC 99M SESTAMIBI 1 DOSE: 1 INJECTION INTRAVENOUS at 10:15

## 2025-04-11 LAB
BH CV REST NUCLEAR ISOTOPE DOSE: 9.5 MCI
BH CV STRESS BP STAGE 1: NORMAL
BH CV STRESS BP STAGE 2: NORMAL
BH CV STRESS DURATION MIN STAGE 1: 3
BH CV STRESS DURATION MIN STAGE 2: 3
BH CV STRESS DURATION SEC STAGE 1: 0
BH CV STRESS DURATION SEC STAGE 2: 30
BH CV STRESS GRADE STAGE 1: 10
BH CV STRESS GRADE STAGE 2: 12
BH CV STRESS HR STAGE 1: 92
BH CV STRESS HR STAGE 2: 141
BH CV STRESS METS STAGE 1: 5
BH CV STRESS METS STAGE 2: 7.5
BH CV STRESS NUCLEAR ISOTOPE DOSE: 32.3 MCI
BH CV STRESS O2 STAGE 1: 99
BH CV STRESS O2 STAGE 2: 97
BH CV STRESS PROTOCOL 1: NORMAL
BH CV STRESS RECOVERY BP: NORMAL MMHG
BH CV STRESS RECOVERY HR: 68 BPM
BH CV STRESS RECOVERY O2: 99 %
BH CV STRESS SPEED STAGE 1: 1.7
BH CV STRESS SPEED STAGE 2: 2.5
BH CV STRESS STAGE 1: 1
BH CV STRESS STAGE 2: 2
MAXIMAL PREDICTED HEART RATE: 150 BPM
PERCENT MAX PREDICTED HR: 94 %
SPECT HRT GATED+EF W RNC IV: 44 %
STRESS BASELINE BP: NORMAL MMHG
STRESS BASELINE HR: 56 BPM
STRESS O2 SAT REST: 97 %
STRESS PERCENT HR: 111 %
STRESS POST ESTIMATED WORKLOAD: 7 METS
STRESS POST EXERCISE DUR MIN: 6 MIN
STRESS POST EXERCISE DUR SEC: 30 SEC
STRESS POST O2 SAT PEAK: 97 %
STRESS POST PEAK BP: NORMAL MMHG
STRESS POST PEAK HR: 141 BPM
STRESS TARGET HR: 128 BPM

## 2025-07-18 ENCOUNTER — TELEPHONE (OUTPATIENT)
Dept: CARDIOLOGY | Facility: CLINIC | Age: 70
End: 2025-07-18
Payer: MEDICARE

## 2025-07-18 NOTE — TELEPHONE ENCOUNTER
LVM for patient letting them know Dr. Rodriguez will be out of the office on 8/7/25. Follow up appt has been rescheduled to 8/29/25. New appointment reminder has been mailed. Relay.

## (undated) DEVICE — INTRO SHEATH PRELUDE IDEAL SPRNG COIL 021 6F 23X80CM

## (undated) DEVICE — KT MANIFOLD CATHLAB CUST

## (undated) DEVICE — DEV COMP RAD PRELUDESYNC 24CM

## (undated) DEVICE — CATH DIAG EXPO M/ PK 5F FL4/FR4 PIG

## (undated) DEVICE — GW INQWIRE FC PTFE STD J/1.5 .035 260

## (undated) DEVICE — CATH DIAG EXPO .045 FL3.5 5F 100CM

## (undated) DEVICE — CATH DIAG EXPO .045 FL3  5F 100CM

## (undated) DEVICE — PK CATH CARD 10